# Patient Record
Sex: FEMALE | Race: WHITE | NOT HISPANIC OR LATINO | Employment: FULL TIME | URBAN - METROPOLITAN AREA
[De-identification: names, ages, dates, MRNs, and addresses within clinical notes are randomized per-mention and may not be internally consistent; named-entity substitution may affect disease eponyms.]

---

## 2022-08-30 ENCOUNTER — TELEPHONE (OUTPATIENT)
Dept: ORTHOPEDIC SURGERY | Facility: CLINIC | Age: 67
End: 2022-08-30

## 2022-08-30 NOTE — TELEPHONE ENCOUNTER
THE ISSUE IS MORE IN HER LEG THAN HER HIP - SHE HAS A GURPREET FROM HER HIP TO HER KNEE AND A LUMP THAT IS GIVING HER PROBLEMS - THE SURGERY WAS DONE ON 12/23/1990 AT [facility] - SHE BELIEVES THE SURGEON WAS DR FRITZ. ALENA HAS HER NOTES

## 2022-09-12 ENCOUNTER — OFFICE VISIT (OUTPATIENT)
Dept: ORTHOPEDIC SURGERY | Facility: CLINIC | Age: 67
End: 2022-09-12

## 2022-09-12 VITALS — HEIGHT: 66 IN | OXYGEN SATURATION: 96 % | BODY MASS INDEX: 24.04 KG/M2 | HEART RATE: 95 BPM | WEIGHT: 149.6 LBS

## 2022-09-12 DIAGNOSIS — M70.62 TROCHANTERIC BURSITIS OF LEFT HIP: ICD-10-CM

## 2022-09-12 DIAGNOSIS — M25.552 LEFT HIP PAIN: Primary | ICD-10-CM

## 2022-09-12 PROCEDURE — 99203 OFFICE O/P NEW LOW 30 MIN: CPT | Performed by: ORTHOPAEDIC SURGERY

## 2022-09-12 RX ORDER — FEXOFENADINE HCL 180 MG/1
TABLET ORAL
COMMUNITY
Start: 2022-06-29

## 2022-09-12 RX ORDER — FLUTICASONE PROPIONATE AND SALMETEROL 50; 100 UG/1; UG/1
POWDER RESPIRATORY (INHALATION)
COMMUNITY
Start: 2022-06-10

## 2022-09-12 RX ORDER — LISINOPRIL 10 MG/1
1 TABLET ORAL DAILY
COMMUNITY

## 2022-09-12 NOTE — PROGRESS NOTES
"Chief Complaint  Initial Evaluation of the Left Hip     Subjective      Kavya De La Cruz presents to [facility] for an evaluation of left hip pain. Patient has a history of a intramedullary hip done in 1990. This has done well until recently. She started having a lot of tenderness and pain along the lateral hip and down the thigh. She denies any groin pain or injury. This began after receiving the COVID vaccine.  She notices stiffness in the gait after sitting for prolonged sitting.     No Known Allergies     Social History     Socioeconomic History   • Marital status:    Tobacco Use   • Smoking status: Never Smoker   • Smokeless tobacco: Never Used        Review of Systems     Objective   Vital Signs:   Pulse 95   Ht 167.6 cm (66\")   Wt 67.9 kg (149 lb 9.6 oz)   SpO2 96%   BMI 24.15 kg/m²       Physical Exam  Constitutional:       Appearance: Normal appearance. Patient is well-developed and normal weight.   HENT:      Head: Normocephalic.      Right Ear: Hearing and external ear normal.      Left Ear: Hearing and external ear normal.      Nose: Nose normal.   Eyes:      Conjunctiva/sclera: Conjunctivae normal.   Cardiovascular:      Rate and Rhythm: Normal rate.   Pulmonary:      Effort: Pulmonary effort is normal.      Breath sounds: No wheezing or rales.   Abdominal:      Palpations: Abdomen is soft.      Tenderness: There is no abdominal tenderness.   Musculoskeletal:      Cervical back: Normal range of motion.   Skin:     Findings: No rash.   Neurological:      Mental Status: Patient  is alert and oriented to person, place, and time.   Psychiatric:         Mood and Affect: Mood and affect normal.         Judgment: Judgment normal.       Ortho Exam      LEFT HIP: Tender greater trochanter. No groin pain with hip motion. Good tone of hip flexors, hip extensors, hip adductor, hip abductors. Full passive hip motion. Calf soft. Dorsal Pedal Pulse 2+, posterior tibialis pulse 2+. " Slight antalgic gait. Hip flexion to 125 degrees. Good strength to hamstrings, quadriceps, dorsiflexors and plantar flexors.       Procedures        Imaging Results (Most Recent)     Procedure Component Value Units Date/Time    XR Hip With or Without Pelvis 2 - 3 View Left [[id number]] Resulted: 09/12/22 0827     Updated: 09/12/22 0830           Result Review :     X-Ray Report:  Left hip(s) X-Ray  Indication: Evaluation of left hip pain   AP and Lateral view(s)  Findings: No acute fractures or dislocation. Mild degenerative changes at the hip. Intramedullary guillaume of the hip and femur in place, no loosening signs.   Prior studies available for comparison: no     Assessment and Plan     Diagnoses and all orders for this visit:    1. Left hip pain (Primary)  -     XR Hip With or Without Pelvis 2 - 3 View Left    2. Trochanteric bursitis of left hip        Patient offered a hip bursa injection today, she will hold off on this. Physical therapy and at home exercises discussed. She opted for physical therapy.     Call or return if worsening symptoms.    Follow Up     PRN.       Patient was given instructions and counseling regarding her condition or for health maintenance advice. Please see specific information pulled into the AVS if appropriate.     Scribed for True Hatfield MD by Jennifer Chao.  09/12/22   08:08 EDT    I have personally performed the services described in this document as scribed by the above individual and it is both accurate and complete. True Hatfield MD 09/12/22

## 2025-01-10 ENCOUNTER — HOSPITAL ENCOUNTER (EMERGENCY)
Facility: HOSPITAL | Age: 70
Discharge: HOME | End: 2025-01-10
Attending: EMERGENCY MEDICINE
Payer: MEDICARE

## 2025-01-10 ENCOUNTER — APPOINTMENT (OUTPATIENT)
Dept: GENERAL RADIOLOGY | Facility: HOSPITAL | Age: 70
End: 2025-01-10
Payer: MEDICARE

## 2025-01-10 VITALS
DIASTOLIC BLOOD PRESSURE: 70 MMHG | SYSTOLIC BLOOD PRESSURE: 141 MMHG | HEIGHT: 66 IN | WEIGHT: 146.61 LBS | OXYGEN SATURATION: 96 % | HEART RATE: 75 BPM | RESPIRATION RATE: 20 BRPM | BODY MASS INDEX: 23.56 KG/M2 | TEMPERATURE: 97.7 F

## 2025-01-10 DIAGNOSIS — S52.611A CLOSED DISPLACED FRACTURE OF STYLOID PROCESS OF RIGHT ULNA, INITIAL ENCOUNTER: ICD-10-CM

## 2025-01-10 DIAGNOSIS — S52.501A CLOSED FRACTURE OF DISTAL END OF RIGHT RADIUS, UNSPECIFIED FRACTURE MORPHOLOGY, INITIAL ENCOUNTER: Primary | ICD-10-CM

## 2025-01-10 LAB
ALBUMIN SERPL-MCNC: 3.9 G/DL (ref 3.5–5.2)
ALBUMIN/GLOB SERPL: 1.6 G/DL
ALP SERPL-CCNC: 76 U/L (ref 39–117)
ALT SERPL W P-5'-P-CCNC: 17 U/L (ref 1–33)
ANION GAP SERPL CALCULATED.3IONS-SCNC: 9.9 MMOL/L (ref 5–15)
AST SERPL-CCNC: 22 U/L (ref 1–32)
BACTERIA UR QL AUTO: NORMAL /HPF
BASOPHILS # BLD AUTO: 0.06 10*3/MM3 (ref 0–0.2)
BASOPHILS NFR BLD AUTO: 1 % (ref 0–1.5)
BILIRUB SERPL-MCNC: 0.9 MG/DL (ref 0–1.2)
BILIRUB UR QL STRIP: NEGATIVE
BUN SERPL-MCNC: 9 MG/DL (ref 8–23)
BUN/CREAT SERPL: 12.2 (ref 7–25)
CALCIUM SPEC-SCNC: 8.9 MG/DL (ref 8.6–10.5)
CHLORIDE SERPL-SCNC: 104 MMOL/L (ref 98–107)
CLARITY UR: CLEAR
CO2 SERPL-SCNC: 25.1 MMOL/L (ref 22–29)
COLOR UR: YELLOW
CREAT SERPL-MCNC: 0.74 MG/DL (ref 0.57–1)
DEPRECATED RDW RBC AUTO: 42.8 FL (ref 37–54)
EGFRCR SERPLBLD CKD-EPI 2021: 87.2 ML/MIN/1.73
EOSINOPHIL # BLD AUTO: 0.16 10*3/MM3 (ref 0–0.4)
EOSINOPHIL NFR BLD AUTO: 2.7 % (ref 0.3–6.2)
ERYTHROCYTE [DISTWIDTH] IN BLOOD BY AUTOMATED COUNT: 12.9 % (ref 12.3–15.4)
GEN 5 1HR TROPONIN T REFLEX: <6 NG/L
GLOBULIN UR ELPH-MCNC: 2.4 GM/DL
GLUCOSE SERPL-MCNC: 109 MG/DL (ref 65–99)
GLUCOSE UR STRIP-MCNC: NEGATIVE MG/DL
HCT VFR BLD AUTO: 41.2 % (ref 34–46.6)
HGB BLD-MCNC: 13.6 G/DL (ref 12–15.9)
HGB UR QL STRIP.AUTO: NEGATIVE
HOLD SPECIMEN: NORMAL
HOLD SPECIMEN: NORMAL
HYALINE CASTS UR QL AUTO: NORMAL /LPF
IMM GRANULOCYTES # BLD AUTO: 0.03 10*3/MM3 (ref 0–0.05)
IMM GRANULOCYTES NFR BLD AUTO: 0.5 % (ref 0–0.5)
KETONES UR QL STRIP: NEGATIVE
LEUKOCYTE ESTERASE UR QL STRIP.AUTO: ABNORMAL
LYMPHOCYTES # BLD AUTO: 1.7 10*3/MM3 (ref 0.7–3.1)
LYMPHOCYTES NFR BLD AUTO: 28.5 % (ref 19.6–45.3)
MAGNESIUM SERPL-MCNC: 2.2 MG/DL (ref 1.6–2.4)
MCH RBC QN AUTO: 30.2 PG (ref 26.6–33)
MCHC RBC AUTO-ENTMCNC: 33 G/DL (ref 31.5–35.7)
MCV RBC AUTO: 91.4 FL (ref 79–97)
MONOCYTES # BLD AUTO: 0.65 10*3/MM3 (ref 0.1–0.9)
MONOCYTES NFR BLD AUTO: 10.9 % (ref 5–12)
NEUTROPHILS NFR BLD AUTO: 3.37 10*3/MM3 (ref 1.7–7)
NEUTROPHILS NFR BLD AUTO: 56.4 % (ref 42.7–76)
NITRITE UR QL STRIP: NEGATIVE
NRBC BLD AUTO-RTO: 0 /100 WBC (ref 0–0.2)
PH UR STRIP.AUTO: 7.5 [PH] (ref 5–8)
PLATELET # BLD AUTO: 252 10*3/MM3 (ref 140–450)
PMV BLD AUTO: 10.2 FL (ref 6–12)
POTASSIUM SERPL-SCNC: 4.1 MMOL/L (ref 3.5–5.2)
PROT SERPL-MCNC: 6.3 G/DL (ref 6–8.5)
PROT UR QL STRIP: NEGATIVE
QT INTERVAL: 376 MS
QTC INTERVAL: 408 MS
RBC # BLD AUTO: 4.51 10*6/MM3 (ref 3.77–5.28)
RBC # UR STRIP: NORMAL /HPF
REF LAB TEST METHOD: NORMAL
SODIUM SERPL-SCNC: 139 MMOL/L (ref 136–145)
SP GR UR STRIP: 1.01 (ref 1–1.03)
SQUAMOUS #/AREA URNS HPF: NORMAL /HPF
TROPONIN T NUMERIC DELTA: NORMAL
TROPONIN T SERPL HS-MCNC: <6 NG/L
UROBILINOGEN UR QL STRIP: ABNORMAL
WBC # UR STRIP: NORMAL /HPF
WBC NRBC COR # BLD AUTO: 5.97 10*3/MM3 (ref 3.4–10.8)
WHOLE BLOOD HOLD COAG: NORMAL
WHOLE BLOOD HOLD SPECIMEN: NORMAL

## 2025-01-10 PROCEDURE — 84484 ASSAY OF TROPONIN QUANT: CPT | Performed by: EMERGENCY MEDICINE

## 2025-01-10 PROCEDURE — 81001 URINALYSIS AUTO W/SCOPE: CPT | Performed by: EMERGENCY MEDICINE

## 2025-01-10 PROCEDURE — 83735 ASSAY OF MAGNESIUM: CPT | Performed by: EMERGENCY MEDICINE

## 2025-01-10 PROCEDURE — 73090 X-RAY EXAM OF FOREARM: CPT

## 2025-01-10 PROCEDURE — 71045 X-RAY EXAM CHEST 1 VIEW: CPT

## 2025-01-10 PROCEDURE — 80053 COMPREHEN METABOLIC PANEL: CPT | Performed by: EMERGENCY MEDICINE

## 2025-01-10 PROCEDURE — 93005 ELECTROCARDIOGRAM TRACING: CPT | Performed by: EMERGENCY MEDICINE

## 2025-01-10 PROCEDURE — 85025 COMPLETE CBC W/AUTO DIFF WBC: CPT | Performed by: EMERGENCY MEDICINE

## 2025-01-10 PROCEDURE — 99284 EMERGENCY DEPT VISIT MOD MDM: CPT

## 2025-01-10 PROCEDURE — 36415 COLL VENOUS BLD VENIPUNCTURE: CPT

## 2025-01-10 PROCEDURE — 25010000002 ONDANSETRON PER 1 MG

## 2025-01-10 PROCEDURE — 96374 THER/PROPH/DIAG INJ IV PUSH: CPT

## 2025-01-10 RX ORDER — HYDROCODONE BITARTRATE AND ACETAMINOPHEN 5; 325 MG/1; MG/1
1 TABLET ORAL EVERY 6 HOURS PRN
Status: DISCONTINUED | OUTPATIENT
Start: 2025-01-10 | End: 2025-01-10

## 2025-01-10 RX ORDER — ONDANSETRON 2 MG/ML
4 INJECTION INTRAMUSCULAR; INTRAVENOUS ONCE
Status: COMPLETED | OUTPATIENT
Start: 2025-01-10 | End: 2025-01-10

## 2025-01-10 RX ORDER — ONDANSETRON 4 MG/1
4 TABLET, ORALLY DISINTEGRATING ORAL ONCE
Status: DISCONTINUED | OUTPATIENT
Start: 2025-01-10 | End: 2025-01-10

## 2025-01-10 RX ORDER — HYDROCODONE BITARTRATE AND ACETAMINOPHEN 5; 325 MG/1; MG/1
1 TABLET ORAL ONCE
Status: COMPLETED | OUTPATIENT
Start: 2025-01-10 | End: 2025-01-10

## 2025-01-10 RX ORDER — HYDROCODONE BITARTRATE AND ACETAMINOPHEN 5; 325 MG/1; MG/1
1 TABLET ORAL 4 TIMES DAILY PRN
Qty: 12 TABLET | Refills: 0 | Status: SHIPPED | OUTPATIENT
Start: 2025-01-10 | End: 2025-01-15 | Stop reason: HOSPADM

## 2025-01-10 RX ORDER — SODIUM CHLORIDE 0.9 % (FLUSH) 0.9 %
10 SYRINGE (ML) INJECTION AS NEEDED
Status: DISCONTINUED | OUTPATIENT
Start: 2025-01-10 | End: 2025-01-10 | Stop reason: HOSPADM

## 2025-01-10 RX ADMIN — ONDANSETRON 4 MG: 2 INJECTION INTRAMUSCULAR; INTRAVENOUS at 10:00

## 2025-01-10 RX ADMIN — HYDROCODONE BITARTRATE AND ACETAMINOPHEN 1 TABLET: 5; 325 TABLET ORAL at 09:59

## 2025-01-10 NOTE — ED PROVIDER NOTES
Time: 9:44 AM EST  Date of encounter:  1/10/2025  Independent Historian/Clinical History and Information was obtained by:   Patient    History is limited by: N/A    Chief Complaint   Patient presents with    Dizziness    Fall         History of Present Illness:  Patient is a 69 y.o. year old female who presents to the emergency department for evaluation of right wrist pain.  Patient states she was walking out to check the mail and when she stepped out she hit ice causing her to fall she is unsure what she hit her wrist on but when she tried to push herself up she had pain in the right wrist and was unable to bear weight on it.  Denies head injury or LOC.  Patient states she got back in her vehicle and drove back up the driveway to her office and when she stepped out she felt sick and dizzy from the pain.  Denies chest pain or shortness of breath.  Denies head injury.    Patient Care Team  Primary Care Provider: ProviderSonia Known    Past Medical History:     No Known Allergies  History reviewed. No pertinent past medical history.  History reviewed. No pertinent surgical history.  History reviewed. No pertinent family history.    Home Medications:  Prior to Admission medications    Medication Sig Start Date End Date Taking? Authorizing Provider   Advair Diskus 100-50 MCG/ACT DISKUS  6/10/22   Nancy Hurt MD   fexofenadine (ALLEGRA) 180 MG tablet  6/29/22   Nancy Hurt MD   lisinopril (PRINIVIL,ZESTRIL) 10 MG tablet Take 1 tablet by mouth Daily.    ProviderNancy MD        Social History:   Social History     Tobacco Use    Smoking status: Never    Smokeless tobacco: Never         Review of Systems:  Review of Systems   Constitutional: Negative.    HENT: Negative.     Eyes: Negative.    Respiratory: Negative.     Cardiovascular: Negative.    Gastrointestinal: Negative.    Endocrine: Negative.    Genitourinary: Negative.    Musculoskeletal:  Positive for arthralgias and joint swelling.  "  Skin: Negative.    Allergic/Immunologic: Negative.    Neurological: Negative.    Hematological: Negative.    Psychiatric/Behavioral: Negative.          Physical Exam:  /70   Pulse 75   Temp 97.7 °F (36.5 °C) (Oral)   Resp 20   Ht 167.6 cm (66\")   Wt 66.5 kg (146 lb 9.7 oz)   SpO2 96%   BMI 23.66 kg/m²         Physical Exam  Vitals and nursing note reviewed.   Constitutional:       General: She is not in acute distress.     Appearance: Normal appearance. She is not toxic-appearing.   HENT:      Head: Normocephalic and atraumatic.      Jaw: There is normal jaw occlusion.      Mouth/Throat:      Mouth: Mucous membranes are moist.      Pharynx: Oropharynx is clear.   Eyes:      General: Lids are normal.      Extraocular Movements: Extraocular movements intact.      Conjunctiva/sclera: Conjunctivae normal.      Pupils: Pupils are equal, round, and reactive to light.   Cardiovascular:      Rate and Rhythm: Normal rate and regular rhythm.      Pulses: Normal pulses.      Heart sounds: Normal heart sounds.   Pulmonary:      Effort: Pulmonary effort is normal. No respiratory distress.      Breath sounds: Normal breath sounds. No stridor. No wheezing, rhonchi or rales.   Abdominal:      General: Abdomen is flat. Bowel sounds are normal.      Palpations: Abdomen is soft.      Tenderness: There is no abdominal tenderness. There is no right CVA tenderness, left CVA tenderness, guarding or rebound.   Musculoskeletal:         General: Normal range of motion.      Right wrist: Swelling, deformity and tenderness present. Decreased range of motion. Normal pulse.      Cervical back: Normal range of motion and neck supple.      Right lower leg: No edema.      Left lower leg: No edema.      Comments: Right wrist swelling with CMS intact distally.   Skin:     General: Skin is warm and dry.   Neurological:      Mental Status: She is alert and oriented to person, place, and time. Mental status is at baseline.   Psychiatric:  "        Mood and Affect: Mood normal.                       Medical Decision Making:      Comorbidities that affect care:    None    External Notes reviewed:    None      The following orders were placed and all results were independently analyzed by me:  Orders Placed This Encounter   Procedures    Splint Application    XR Chest 1 View    XR Forearm 2 View Right    [place] Draw    Comprehensive Metabolic Panel    High Sensitivity Troponin T    Magnesium    Urinalysis With Microscopic If Indicated (No Culture) - Urine, Clean Catch    CBC Auto Differential    High Sensitivity Troponin T 1Hr    Urinalysis, Microscopic Only - Urine, Clean Catch    Ambulatory Referral to Orthopedic Surgery    NPO Diet NPO Type: Strict NPO    Undress & Gown    Continuous Pulse Oximetry    Vital Signs    Orthostatic Blood Pressure    Obtain & Apply The Following- Upper extremity; Sling    Orthopedics (on-call MD unless specified)    Oxygen Therapy- Nasal Cannula; Titrate 1-6 LPM Per SpO2; 90 - 95%    POC Glucose Once    ECG 12 Lead ED Triage Standing Order; Weak / Dizzy / AMS    Insert Peripheral IV    Fall Precautions    CBC & Differential    Green Top (Gel)    Lavender Top    Gold Top - SST    Light Blue Top       Medications Given in the Emergency Department:  Medications   sodium chloride 0.9 % flush 10 mL (has no administration in time range)   HYDROcodone-acetaminophen (NORCO) 5-325 MG per tablet 1 tablet (1 tablet Oral Given 1/10/25 0959)   ondansetron (ZOFRAN) injection 4 mg (4 mg Intravenous Given 1/10/25 1000)        ED Course:    The patient was initially evaluated in the triage area where orders were placed. The patient was later dispositioned by VASU Crane.      The patient was advised to stay for completion of workup which includes but is not limited to communication of labs and radiological results, reassessment and plan. The patient was advised that leaving prior to disposition by a provider could result in  critical findings that are not communicated to the patient.          Labs:    Lab Results (last 24 hours)       Procedure Component Value Units Date/Time    CBC & Differential [[id number]]  (Normal) Collected: 01/10/25 0732    Specimen: Blood Updated: 01/10/25 0741    Narrative:      The following orders were created for panel order CBC & Differential.  Procedure                               Abnormality         Status                     ---------                               -----------         ------                     CBC Auto Differential[[id number]]        Normal              Final result                 Please view results for these tests on the individual orders.    Comprehensive Metabolic Panel [[id number]]  (Abnormal) Collected: 01/10/25 0732    Specimen: Blood Updated: 01/10/25 0800     Glucose 109 mg/dL      BUN 9 mg/dL      Creatinine 0.74 mg/dL      Sodium 139 mmol/L      Potassium 4.1 mmol/L      Comment: Slight hemolysis detected by analyzer. Result may be falsely elevated.        Chloride 104 mmol/L      CO2 25.1 mmol/L      Calcium 8.9 mg/dL      Total Protein 6.3 g/dL      Albumin 3.9 g/dL      ALT (SGPT) 17 U/L      AST (SGOT) 22 U/L      Comment: Slight hemolysis detected by analyzer. Result may be falsely elevated.        Alkaline Phosphatase 76 U/L      Total Bilirubin 0.9 mg/dL      Globulin 2.4 gm/dL      A/G Ratio 1.6 g/dL      BUN/Creatinine Ratio 12.2     Anion Gap 9.9 mmol/L      eGFR 87.2 mL/min/1.73     Narrative:      GFR Categories in Chronic Kidney Disease (CKD)      GFR Category          GFR (mL/min/1.73)    Interpretation  G1                     90 or greater         Normal or high (1)  G2                      60-89                Mild decrease (1)  G3a                   45-59                Mild to moderate decrease  G3b                   30-44                Moderate to severe decrease  G4                    15-29                Severe decrease  G5                    14 or less            Kidney failure          (1)In the absence of evidence of kidney disease, neither GFR category G1 or G2 fulfill the criteria for CKD.    eGFR calculation 2021 CKD-EPI creatinine equation, which does not include race as a factor    High Sensitivity Troponin T [[id number]]  (Normal) Collected: 01/10/25 0732    Specimen: Blood Updated: 01/10/25 0800     HS Troponin T <6 ng/L     Narrative:      High Sensitive Troponin T Reference Range:  <14.0 ng/L- Negative Female for AMI  <22.0 ng/L- Negative Male for AMI  >=14 - Abnormal Female indicating possible myocardial injury.  >=22 - Abnormal Male indicating possible myocardial injury.   Clinicians would have to utilize clinical acumen, EKG, Troponin, and serial changes to determine if it is an Acute Myocardial Infarction or myocardial injury due to an underlying chronic condition.         Magnesium [[id number]]  (Normal) Collected: 01/10/25 0732    Specimen: Blood Updated: 01/10/25 0800     Magnesium 2.2 mg/dL     CBC Auto Differential [[id number]]  (Normal) Collected: 01/10/25 0732    Specimen: Blood Updated: 01/10/25 0741     WBC 5.97 10*3/mm3      RBC 4.51 10*6/mm3      Hemoglobin 13.6 g/dL      Hematocrit 41.2 %      MCV 91.4 fL      MCH 30.2 pg      MCHC 33.0 g/dL      RDW 12.9 %      RDW-SD 42.8 fl      MPV 10.2 fL      Platelets 252 10*3/mm3      Neutrophil % 56.4 %      Lymphocyte % 28.5 %      Monocyte % 10.9 %      Eosinophil % 2.7 %      Basophil % 1.0 %      Immature Grans % 0.5 %      Neutrophils, Absolute 3.37 10*3/mm3      Lymphocytes, Absolute 1.70 10*3/mm3      Monocytes, Absolute 0.65 10*3/mm3      Eosinophils, Absolute 0.16 10*3/mm3      Basophils, Absolute 0.06 10*3/mm3      Immature Grans, Absolute 0.03 10*3/mm3      nRBC 0.0 /100 WBC     Urinalysis With Microscopic If Indicated (No Culture) - Urine, Clean Catch [[id number]]  (Abnormal) Collected: 01/10/25 0849    Specimen: Urine, Clean Catch Updated: 01/10/25 0910     Color, UA Yellow     Appearance,  UA Clear     pH, UA 7.5     Specific Gravity, UA 1.009     Glucose, UA Negative     Ketones, UA Negative     Bilirubin, UA Negative     Blood, UA Negative     Protein, UA Negative     Leuk Esterase, UA Small (1+)     Nitrite, UA Negative     Urobilinogen, UA 1.0 E.U./dL    Urinalysis, Microscopic Only - Urine, Clean Catch [[id number]] Collected: 01/10/25 0849    Specimen: Urine, Clean Catch Updated: 01/10/25 0910     RBC, UA 0-2 /HPF      WBC, UA 0-2 /HPF      Bacteria, UA None Seen /HPF      Squamous Epithelial Cells, UA 0-2 /HPF      Hyaline Casts, UA 3-6 /LPF      Methodology Automated Microscopy    High Sensitivity Troponin T 1Hr [[id number]] Collected: 01/10/25 0854    Specimen: Blood Updated: 01/10/25 0921     HS Troponin T <6 ng/L      Troponin T Numeric Delta --     Comment: Unable to calculate.       Narrative:      High Sensitive Troponin T Reference Range:  <14.0 ng/L- Negative Female for AMI  <22.0 ng/L- Negative Male for AMI  >=14 - Abnormal Female indicating possible myocardial injury.  >=22 - Abnormal Male indicating possible myocardial injury.   Clinicians would have to utilize clinical acumen, EKG, Troponin, and serial changes to determine if it is an Acute Myocardial Infarction or myocardial injury due to an underlying chronic condition.                  Imaging:    XR Chest 1 View    Result Date: 1/10/2025  XR CHEST 1 VW, XR FOREARM 2 VW RIGHT Date of Exam: 1/10/2025 7:40 AM EST Indication: Weak/Dizzy/AMS triage protocol Comparison: None available. Findings: There are no airspace consolidations. No pleural fluid. No pneumothorax. The pulmonary vasculature appears within normal limits. The cardiac and mediastinal silhouette appear unremarkable. No acute osseous abnormality identified. Evaluation of the right forearm demonstrates a comminuted distal radius fracture with mild dorsal displacement. There is generalized soft tissue swelling. A minimally displaced ulnar styloid process fracture is  suspected. The mid shaft and proximal right  radius and ulna appear intact.     Impression: 1. No acute cardiopulmonary process. 2. Comminuted distal right radius fracture and suspected minimally displaced ulnar styloid process fracture with generalized soft tissue swelling at the level of the wrist. Electronically Signed: Phyllis Palma MD  1/10/2025 8:04 AM EST  Workstation ID: [id number]    XR Forearm 2 View Right    Result Date: 1/10/2025  XR CHEST 1 VW, XR FOREARM 2 VW RIGHT Date of Exam: 1/10/2025 7:40 AM EST Indication: Weak/Dizzy/AMS triage protocol Comparison: None available. Findings: There are no airspace consolidations. No pleural fluid. No pneumothorax. The pulmonary vasculature appears within normal limits. The cardiac and mediastinal silhouette appear unremarkable. No acute osseous abnormality identified. Evaluation of the right forearm demonstrates a comminuted distal radius fracture with mild dorsal displacement. There is generalized soft tissue swelling. A minimally displaced ulnar styloid process fracture is suspected. The mid shaft and proximal right  radius and ulna appear intact.     Impression: 1. No acute cardiopulmonary process. 2. Comminuted distal right radius fracture and suspected minimally displaced ulnar styloid process fracture with generalized soft tissue swelling at the level of the wrist. Electronically Signed: Phyllis Palma MD  1/10/2025 8:04 AM EST  Workstation ID: [id number]       Differential Diagnosis and Discussion:      Orthopedic Injuries: Differential diagnosis includes but is not limited to fractures, soft tissue injuries, dislocations, contusions, ligamentous injuries, tendon injuries, nerve injuries, compartment syndrome, bursitis, and vascular injuries.    PROCEDURES:    Labs were collected in the emergency department and all labs were reviewed and interpreted by me.  X-ray were performed in the emergency department and all X-ray impressions were independently  interpreted by me.  An EKG was performed and the EKG was interpreted by supervising attending.    ECG 12 Lead ED Triage Standing Order; Weak / Dizzy / AMS   Preliminary Result   HEART RATE=71  bpm   RR Wymbpnud=159  ms   LA Rhrakldn=451  ms   P Horizontal Axis=-4  deg   P Front Axis=25  deg   QRSD Interval=87  ms   QT Exdpwpgs=404  ms   FUbK=052  ms   QRS Axis=-5  deg   T Wave Axis=45  deg   - NORMAL ECG -   Sinus rhythm   Date and Time of Study:2025-01-10 07:58:33           Procedures    MDM  Number of Diagnoses or Management Options  Closed displaced fracture of styloid process of right ulna, initial encounter  Closed fracture of distal end of right radius, unspecified fracture morphology, initial encounter  Diagnosis management comments: The patient´s symptoms are consistent with musculoskeletal pain. The patient is now resting comfortably, feels better, is alert, talkative, interactive and in no distress. The repeat examination is unremarkable and benign. The patient has circulatory, motor, and sensory intact. The patient is otherwise alert and well appearing. The history, physical exam, and diagnostics (if any) do not suggest the presence of soft tissue sprain, tendonitis, tendon injury, dislocation, deep vein thrombosis, arterial insufficiency, osteoarthritis, bursitis, ligamentous damage or other process requiring further testing, treatment or consultation in the emergency department. The vital signs have been stable. The patient's condition is stable and appropriate for discharge. The patient will pursue further outpatient evaluation with the primary care physician or other designated for consulting position as indicated in the discharge instructions.  Imaging reveals distal radius fracture and possible ulnar styloid fracture.  Patient was placed in a sugar-tong splint and given a referral to Dr. Snell for follow-up in the office.  Patient is also provided a prescription for pain medication.  Patient  verbalized understanding and is agreeable plan for discharge.       Amount and/or Complexity of Data Reviewed  Clinical lab tests: reviewed and ordered  Tests in the radiology section of CPT®: reviewed and ordered  Tests in the medicine section of CPT®: reviewed and ordered  Review and summarize past medical records: yes  Discuss the patient with other providers: yes  Independent visualization of images, tracings, or specimens: yes           Patient Care Considerations:    ANTIBIOTICS: I considered prescribing antibiotics as an outpatient however no bacterial focus of infection was found.      Consultants/Shared Management Plan:    SHARED VISIT: I have discussed the case with my supervising physician, Dr. Carlos who stateshe will sign pt narcotic prescription for pain control at home. The substantive portion of the medical decision was made by the attesting physician who made or approve the management plan and will take responsibility for the patient.  Clinical findings were discussed and ultimate disposition was made in consult with supervising physician.  Consultant: I have discussed the case with Dr. Rosado who states place patient in sugar-tong and have them follow-up in the office with him.    Social Determinants of Health:    Patient is independent, reliable, and has access to care.       Disposition and Care Coordination:    Discharged: The patient is suitable and stable for discharge with no need for consideration of admission.    I have explained the patient´s condition, diagnoses and treatment plan based on the information available to me at this time. I have answered questions and addressed any concerns. The patient has a good  understanding of the patient´s diagnosis, condition, and treatment plan as can be expected at this point. The vital signs have been stable. The patient´s condition is stable and appropriate for discharge from the emergency department.      The patient will pursue further  outpatient evaluation with the primary care physician or other designated or consulting physician as outlined in the discharge instructions. They are agreeable to this plan of care and follow-up instructions have been explained in detail. The patient has received these instructions in written format and has expressed an understanding of the discharge instructions. The patient is aware that any significant change in condition or worsening of symptoms should prompt an immediate return to this or the closest emergency department or call to 1.  I have explained discharge medications and the need for follow up with the patient/caretakers. This was also printed in the discharge instructions. Patient was discharged with the following medications and follow up:      Medication List        New Prescriptions      HYDROcodone-acetaminophen 5-325 MG per tablet  Commonly known as: NORCO  Take 1 tablet by mouth 4 (Four) Times a Day As Needed for Moderate Pain.               Where to Get Your Medications        These medications [id number] [place] [facility] [address] AT [facility] [phone] [facility] [phone] FX  [address] [address]      Phone: [phone]   HYDROcodone-acetaminophen 5-325 MG per tablet      Yousuf Rosado MD  [address]  [place] [id number] [phone]          Provider, No Known  [facility] [address] [phone]    Call   As needed       Final diagnoses:   Closed fracture of distal end of right radius, unspecified fracture morphology, initial encounter   Closed displaced fracture of styloid process of right ulna, initial encounter        ED Disposition       ED Disposition   Discharge    Condition   Stable    Comment   --               This medical record created using voice recognition software.             Nayeli Freeman, APRN  01/10/25 1244

## 2025-01-10 NOTE — DISCHARGE INSTRUCTIONS
Home.  We have sent a prescription for pain medicine to your pharmacy.  Please  and take as directed for pain control.  Keep your splint clean and dry.  We have also provided you a sling for comfort and stability.  Wear this when you are up moving around.  However, when you are sitting make sure you have arm elevated and you are applying ice at least 4 times daily.  Using a barrier between your skin and the ice leave in place for 15 to 20 minutes at least 4 times a day.    A referral has been sent to Dr. Rosado.  Their office will call you and schedule an appointment for follow-up.    If symptoms worsen, change in presentation, or you develop new symptoms please seek medical attention or return to the ED for further evaluation.

## 2025-01-14 ENCOUNTER — PREP FOR SURGERY (OUTPATIENT)
Dept: OTHER | Facility: HOSPITAL | Age: 70
End: 2025-01-14
Payer: MEDICARE

## 2025-01-14 ENCOUNTER — ANESTHESIA EVENT (OUTPATIENT)
Dept: PERIOP | Facility: HOSPITAL | Age: 70
End: 2025-01-14
Payer: MEDICARE

## 2025-01-14 ENCOUNTER — OFFICE VISIT (OUTPATIENT)
Dept: ORTHOPEDIC SURGERY | Facility: CLINIC | Age: 70
End: 2025-01-14
Payer: MEDICARE

## 2025-01-14 VITALS — HEIGHT: 66 IN | WEIGHT: 146 LBS | BODY MASS INDEX: 23.46 KG/M2

## 2025-01-14 DIAGNOSIS — S62.101A WRIST FRACTURE, CLOSED, RIGHT, INITIAL ENCOUNTER: Primary | ICD-10-CM

## 2025-01-14 DIAGNOSIS — S52.501A CLOSED FRACTURE OF DISTAL END OF RIGHT RADIUS, UNSPECIFIED FRACTURE MORPHOLOGY, INITIAL ENCOUNTER: Primary | ICD-10-CM

## 2025-01-14 RX ORDER — RIZATRIPTAN BENZOATE 10 MG/1
10 TABLET ORAL
COMMUNITY

## 2025-01-14 RX ORDER — ALBUTEROL SULFATE 90 UG/1
2 INHALANT RESPIRATORY (INHALATION) EVERY 4 HOURS PRN
COMMUNITY

## 2025-01-14 NOTE — PRE-PROCEDURE INSTRUCTIONS
ATIENT INSTRUCTED TO BE:    - NOTHING TO EAT AFTER MIDNIGHT OR CHEW, EXCEPT CAN HAVE CLEAR LIQUIDS 2 HOURS PRIOR TO SURGERY ARRIVAL TIME , NO MORE THAN 8 OZ. (NOTHING RED)     - TO HOLD ALL VITAMINS, SUPPLEMENTS, NSAIDS FOR ONE WEEK PRIOR TO THEIR SURGICAL PROCEDURE    - DO NOT TAKE __________NA____________ 7 DAYS PRIOR TO PROCEDURE PER ANESTHESIA RECOMMENDATIONS/INSTRUCTIONS     - INSTRUCTED PT TO USE SURGICAL SOAP 1 TIME THE NIGHT PRIOR TO SURGERY ___________ OR THE AM OF SURGERY _____________   USE THE SOAP FROM NECK TO TOES, AVOID THEIR FACE, HAIR, AND PRIVATE PARTS. IF USE THE SOAP THE NIGHT PRIOR TO SURGERY, CHANGE BED LINENS AND NO PETS IN THE BED.     INSTRUCTED NO LOTIONS, JEWELRY, PIERCINGS,  NAIL POLISH, OR DEODORANT DAY OF SURGERY    - IF DIABETIC, CHECK BLOOD GLUCOSE IF LESS THAN 70 OR HAVING SYMPTOMS CALL THE PREOP AREA FOR INSTRUCTIONS ON AM OF SURGERY ([phone]  -INSTRUCTED TO TAKE THE FOLLOWING MEDICATIONS THE DAY OF SURGERY WITH SIPS OF WATER:   ASHLEY INHALER, PROAIR INHALER AS NEEDED, NORCO AS NEEDED, TYLENOL AS NEEDED        - DO NOT BRING ANY MEDICATIONS WITH YOU TO THE HOSPITAL THE DAY OF SURGERY, EXCEPT IF USE INHALERS. BRING INHALERS DAY OF SURGERY       - BRING CPAP OR BIPAP TO THE HOSPITAL ONLY IF YOU ARE SPENDING THE NIGHT    - DO NOT SMOKE OR VAPE 24 HOURS PRIOR TO PROCEDURE PER ANESTHESIA REQUEST     -MAKE SURE YOU HAVE A RIDE HOME OR SOMEONE TO STAY WITH YOU THE DAY OF THE PROCEDURE AFTER YOU GO HOME     - FOLLOW ANY OTHER INSTRUCTIONS GIVEN TO YOU BY YOUR SURGEON'S OFFICE.     - DAY OF SURGERY ____________, COME TO ELEVATOR A, THIRD FLOOR, CHECK IN AT THE DESK FOR REGISTRATION/SURGERY   - YOU WILL RECEIVE A PHONE CALL THE DAY PRIOR TO SURGERY BETWEEN 1PM AND 4 PM WITH ARRIVAL TIME, IF YOUR SURGERY IS ON A MONDAY YOU WILL RECEIVE A CALL THE FRIDAY PRIOR TO SURGERY DATE    - BRING CASH OR CREDIT CARD FOR COPAYMENT OF MEDICATIONS AFTER SURGERY IF YOU USE THE HOSPITAL PHARMACY (MEDS TO  BED)    - PREADMISSION TESTING NURSE KIKA FRIED [phone] IF HAVE ANY QUESTIONS     -PATIENT PROVIDED THE NUMBER FOR PREOP SURGICAL DEPT IF HAD QUESTIONS AFTER HOURS PRIOR TO SURGERY ([phone] ).  INFORMED PT IF NO ANSWER, LEAVE A MESSAGE AND SOMEONE WILL RETURN THEIR CALL       PATIENT VERBALIZED UNDERSTANDING

## 2025-01-14 NOTE — PROGRESS NOTES
"Chief Complaint  Initial Evaluation of the Right Wrist       Subjective      Kavya De La Cruz presents to [facility] for an evaluation  of her right wrist. She got of her car when she slipped and fell and landed on her right wrist. She went to the emergency room on 01/10/25 where she had x-rays and placed into a long arm splint and sling. She denies any prior surgery on her right wrist.     No Known Allergies     Social History     Socioeconomic History    Marital status:    Tobacco Use    Smoking status: Never    Smokeless tobacco: Never   Substance and Sexual Activity    Alcohol use: Defer    Drug use: Defer    Sexual activity: Defer        I reviewed the patient's chief complaint, history of present illness, review of systems, past medical history, surgical history, family history, social history, medications, and allergy list.     Review of Systems     Constitutional: Denies fevers, chills, weight loss  Cardiovascular: Denies chest pain, shortness of breath  Skin: Denies rashes, acute skin changes  Neurologic: Denies headache, loss of consciousness  MSK: Right wrist pain       Vital Signs:   Ht 167.6 cm (66\")   Wt 66.2 kg (146 lb)   BMI 23.57 kg/m²            Ortho Exam    Physical Exam  General:Alert. No acute distress     Right upper extremity: long arm splint is clean dry and intact, able to wiggle her fingers, neurovascularly intact, sensation intact, positive  pulses, limited range of motion to the wrist secondary to pain, tender to palpation to the wrist.     Procedures    Imaging Results (Most Recent)       None             Result Review :       XR Chest 1 View    Result Date: 1/10/2025  Narrative: XR CHEST 1 VW, XR FOREARM 2 VW RIGHT Date of Exam: 1/10/2025 7:40 AM EST Indication: Weak/Dizzy/AMS triage protocol Comparison: None available. Findings: There are no airspace consolidations. No pleural fluid. No pneumothorax. The pulmonary vasculature appears within normal " limits. The cardiac and mediastinal silhouette appear unremarkable. No acute osseous abnormality identified. Evaluation of the right forearm demonstrates a comminuted distal radius fracture with mild dorsal displacement. There is generalized soft tissue swelling. A minimally displaced ulnar styloid process fracture is suspected. The mid shaft and proximal right  radius and ulna appear intact.     Impression: Impression: 1. No acute cardiopulmonary process. 2. Comminuted distal right radius fracture and suspected minimally displaced ulnar styloid process fracture with generalized soft tissue swelling at the level of the wrist. Electronically Signed: Phyllis Palma MD  1/10/2025 8:04 AM EST  Workstation ID: [id number]    XR Forearm 2 View Right    Result Date: 1/10/2025  Narrative: XR CHEST 1 VW, XR FOREARM 2 VW RIGHT Date of Exam: 1/10/2025 7:40 AM EST Indication: Weak/Dizzy/AMS triage protocol Comparison: None available. Findings: There are no airspace consolidations. No pleural fluid. No pneumothorax. The pulmonary vasculature appears within normal limits. The cardiac and mediastinal silhouette appear unremarkable. No acute osseous abnormality identified. Evaluation of the right forearm demonstrates a comminuted distal radius fracture with mild dorsal displacement. There is generalized soft tissue swelling. A minimally displaced ulnar styloid process fracture is suspected. The mid shaft and proximal right  radius and ulna appear intact.     Impression: Impression: 1. No acute cardiopulmonary process. 2. Comminuted distal right radius fracture and suspected minimally displaced ulnar styloid process fracture with generalized soft tissue swelling at the level of the wrist. Electronically Signed: Phyllis Palma MD  1/10/2025 8:04 AM EST  Workstation ID: [id number]            Assessment and Plan     Diagnoses and all orders for this visit:    1. Wrist fracture, closed, right, initial encounter (Primary)        The  patient presents here today for an evaluation  of her right wrist.     Discussed operative treatment options regarding a right wrist ORIF versus non operative treatment options regarding casting to 4 - 6 weeks.     Patient wishes to proceed with operative treatment options. Risks and benefits were discussed and reviewed with the patient today in the office. Patient expressed understanding and wishes to proceed.     Discussed surgery., Risks/benefits discussed with patient including, but not limited to: infection, bleeding, neurovascular damage, re-rupture, aesthetic deformity, need for further surgery, and death., Surgery pamphlet given., and Call or return if worsening symptoms.    Follow Up     2 weeks post-operatively      Will obtain X-Rays of right wrist at next visit.       Patient was given instructions and counseling regarding her condition or for health maintenance advice. Please see specific information pulled into the AVS if appropriate.     Scribed for Yousuf Rosado MD by Kelly Fierro.  01/14/25   10:34 EST    I have personally performed the services described in this document as scribed by the above individual and it is both accurate and complete. Yousuf Rosado MD 01/14/25

## 2025-01-14 NOTE — H&P (VIEW-ONLY)
"Chief Complaint  Initial Evaluation of the Right Wrist       Subjective      Kavya De La Cruz presents to [facility] for an evaluation  of her right wrist. She got of her car when she slipped and fell and landed on her right wrist. She went to the emergency room on 01/10/25 where she had x-rays and placed into a long arm splint and sling. She denies any prior surgery on her right wrist.     No Known Allergies     Social History     Socioeconomic History    Marital status:    Tobacco Use    Smoking status: Never    Smokeless tobacco: Never   Substance and Sexual Activity    Alcohol use: Defer    Drug use: Defer    Sexual activity: Defer        I reviewed the patient's chief complaint, history of present illness, review of systems, past medical history, surgical history, family history, social history, medications, and allergy list.     Review of Systems     Constitutional: Denies fevers, chills, weight loss  Cardiovascular: Denies chest pain, shortness of breath  Skin: Denies rashes, acute skin changes  Neurologic: Denies headache, loss of consciousness  MSK: Right wrist pain       Vital Signs:   Ht 167.6 cm (66\")   Wt 66.2 kg (146 lb)   BMI 23.57 kg/m²            Ortho Exam    Physical Exam  General:Alert. No acute distress     Right upper extremity: long arm splint is clean dry and intact, able to wiggle her fingers, neurovascularly intact, sensation intact, positive  pulses, limited range of motion to the wrist secondary to pain, tender to palpation to the wrist.     Procedures    Imaging Results (Most Recent)       None             Result Review :       XR Chest 1 View    Result Date: 1/10/2025  Narrative: XR CHEST 1 VW, XR FOREARM 2 VW RIGHT Date of Exam: 1/10/2025 7:40 AM EST Indication: Weak/Dizzy/AMS triage protocol Comparison: None available. Findings: There are no airspace consolidations. No pleural fluid. No pneumothorax. The pulmonary vasculature appears within normal " limits. The cardiac and mediastinal silhouette appear unremarkable. No acute osseous abnormality identified. Evaluation of the right forearm demonstrates a comminuted distal radius fracture with mild dorsal displacement. There is generalized soft tissue swelling. A minimally displaced ulnar styloid process fracture is suspected. The mid shaft and proximal right  radius and ulna appear intact.     Impression: Impression: 1. No acute cardiopulmonary process. 2. Comminuted distal right radius fracture and suspected minimally displaced ulnar styloid process fracture with generalized soft tissue swelling at the level of the wrist. Electronically Signed: Phyllis Palma MD  1/10/2025 8:04 AM EST  Workstation ID: [id number]    XR Forearm 2 View Right    Result Date: 1/10/2025  Narrative: XR CHEST 1 VW, XR FOREARM 2 VW RIGHT Date of Exam: 1/10/2025 7:40 AM EST Indication: Weak/Dizzy/AMS triage protocol Comparison: None available. Findings: There are no airspace consolidations. No pleural fluid. No pneumothorax. The pulmonary vasculature appears within normal limits. The cardiac and mediastinal silhouette appear unremarkable. No acute osseous abnormality identified. Evaluation of the right forearm demonstrates a comminuted distal radius fracture with mild dorsal displacement. There is generalized soft tissue swelling. A minimally displaced ulnar styloid process fracture is suspected. The mid shaft and proximal right  radius and ulna appear intact.     Impression: Impression: 1. No acute cardiopulmonary process. 2. Comminuted distal right radius fracture and suspected minimally displaced ulnar styloid process fracture with generalized soft tissue swelling at the level of the wrist. Electronically Signed: Phyllis Palma MD  1/10/2025 8:04 AM EST  Workstation ID: [id number]            Assessment and Plan     Diagnoses and all orders for this visit:    1. Wrist fracture, closed, right, initial encounter (Primary)        The  patient presents here today for an evaluation  of her right wrist.     Discussed operative treatment options regarding a right wrist ORIF versus non operative treatment options regarding casting to 4 - 6 weeks.     Patient wishes to proceed with operative treatment options. Risks and benefits were discussed and reviewed with the patient today in the office. Patient expressed understanding and wishes to proceed.     Discussed surgery., Risks/benefits discussed with patient including, but not limited to: infection, bleeding, neurovascular damage, re-rupture, aesthetic deformity, need for further surgery, and death., Surgery pamphlet given., and Call or return if worsening symptoms.    Follow Up     2 weeks post-operatively      Will obtain X-Rays of right wrist at next visit.       Patient was given instructions and counseling regarding her condition or for health maintenance advice. Please see specific information pulled into the AVS if appropriate.     Scribed for Yousuf Rosado MD by Kelly Fierro.  01/14/25   10:34 EST    I have personally performed the services described in this document as scribed by the above individual and it is both accurate and complete. Yousuf Rosado MD 01/14/25

## 2025-01-15 ENCOUNTER — ANESTHESIA EVENT CONVERTED (OUTPATIENT)
Dept: ANESTHESIOLOGY | Facility: HOSPITAL | Age: 70
End: 2025-01-15
Payer: MEDICARE

## 2025-01-15 ENCOUNTER — HOSPITAL ENCOUNTER (OUTPATIENT)
Facility: HOSPITAL | Age: 70
Setting detail: HOSPITAL OUTPATIENT SURGERY
Discharge: HOME | End: 2025-01-15
Attending: ORTHOPAEDIC SURGERY | Admitting: ORTHOPAEDIC SURGERY
Payer: MEDICARE

## 2025-01-15 ENCOUNTER — ANESTHESIA (OUTPATIENT)
Dept: PERIOP | Facility: HOSPITAL | Age: 70
End: 2025-01-15
Payer: MEDICARE

## 2025-01-15 ENCOUNTER — APPOINTMENT (OUTPATIENT)
Dept: GENERAL RADIOLOGY | Facility: HOSPITAL | Age: 70
End: 2025-01-15
Payer: MEDICARE

## 2025-01-15 VITALS
BODY MASS INDEX: 24.61 KG/M2 | SYSTOLIC BLOOD PRESSURE: 140 MMHG | HEIGHT: 65 IN | RESPIRATION RATE: 16 BRPM | OXYGEN SATURATION: 95 % | TEMPERATURE: 97.8 F | DIASTOLIC BLOOD PRESSURE: 63 MMHG | WEIGHT: 147.71 LBS | HEART RATE: 71 BPM

## 2025-01-15 DIAGNOSIS — S52.611A CLOSED DISPLACED FRACTURE OF STYLOID PROCESS OF RIGHT ULNA, INITIAL ENCOUNTER: ICD-10-CM

## 2025-01-15 DIAGNOSIS — S52.501A CLOSED FRACTURE OF DISTAL END OF RIGHT RADIUS, UNSPECIFIED FRACTURE MORPHOLOGY, INITIAL ENCOUNTER: ICD-10-CM

## 2025-01-15 PROCEDURE — 76000 FLUOROSCOPY <1 HR PHYS/QHP: CPT

## 2025-01-15 PROCEDURE — 25607 OPTX DST RD XARTC FX/EPI SEP: CPT | Performed by: ORTHOPAEDIC SURGERY

## 2025-01-15 PROCEDURE — 25607 OPTX DST RD XARTC FX/EPI SEP: CPT | Performed by: PHYSICIAN ASSISTANT

## 2025-01-15 PROCEDURE — 25010000002 DEXAMETHASONE PER 1 MG

## 2025-01-15 PROCEDURE — 25010000002 KETOROLAC TROMETHAMINE PER 15 MG: Performed by: NURSE ANESTHETIST, CERTIFIED REGISTERED

## 2025-01-15 PROCEDURE — 25010000002 LIDOCAINE PF 2% 2 % SOLUTION

## 2025-01-15 PROCEDURE — 25810000003 LACTATED RINGERS PER 1000 ML: Performed by: ANESTHESIOLOGY

## 2025-01-15 PROCEDURE — 25010000002 FENTANYL CITRATE (PF) 50 MCG/ML SOLUTION

## 2025-01-15 PROCEDURE — 25010000002 ONDANSETRON PER 1 MG

## 2025-01-15 PROCEDURE — C1713 ANCHOR/SCREW BN/BN,TIS/BN: HCPCS | Performed by: ORTHOPAEDIC SURGERY

## 2025-01-15 PROCEDURE — 25010000002 PROPOFOL 10 MG/ML EMULSION

## 2025-01-15 PROCEDURE — 25010000002 BUPIVACAINE (PF) 0.5 % SOLUTION: Performed by: ANESTHESIOLOGY

## 2025-01-15 PROCEDURE — 25010000002 ONDANSETRON PER 1 MG: Performed by: NURSE ANESTHETIST, CERTIFIED REGISTERED

## 2025-01-15 PROCEDURE — 25010000002 HYDROMORPHONE 1 MG/ML SOLUTION: Performed by: NURSE ANESTHETIST, CERTIFIED REGISTERED

## 2025-01-15 PROCEDURE — 25810000003 LACTATED RINGERS PER 1000 ML

## 2025-01-15 RX ORDER — ACETAMINOPHEN 500 MG
1000 TABLET ORAL ONCE
Status: COMPLETED | OUTPATIENT
Start: 2025-01-15 | End: 2025-01-15

## 2025-01-15 RX ORDER — HYDROCODONE BITARTRATE AND ACETAMINOPHEN 7.5; 325 MG/1; MG/1
1 TABLET ORAL EVERY 4 HOURS PRN
Qty: 30 TABLET | Refills: 0 | Status: SHIPPED | OUTPATIENT
Start: 2025-01-15 | End: 2025-01-15

## 2025-01-15 RX ORDER — PROMETHAZINE HYDROCHLORIDE 12.5 MG/1
25 TABLET ORAL ONCE AS NEEDED
Status: DISCONTINUED | OUTPATIENT
Start: 2025-01-15 | End: 2025-01-15 | Stop reason: HOSPADM

## 2025-01-15 RX ORDER — LIDOCAINE HYDROCHLORIDE 20 MG/ML
INJECTION, SOLUTION EPIDURAL; INFILTRATION; INTRACAUDAL; PERINEURAL AS NEEDED
Status: DISCONTINUED | OUTPATIENT
Start: 2025-01-15 | End: 2025-01-15 | Stop reason: SURG

## 2025-01-15 RX ORDER — SODIUM CHLORIDE, SODIUM LACTATE, POTASSIUM CHLORIDE, CALCIUM CHLORIDE 600; 310; 30; 20 MG/100ML; MG/100ML; MG/100ML; MG/100ML
INJECTION, SOLUTION INTRAVENOUS CONTINUOUS PRN
Status: DISCONTINUED | OUTPATIENT
Start: 2025-01-15 | End: 2025-01-15 | Stop reason: SURG

## 2025-01-15 RX ORDER — HYDROCODONE BITARTRATE AND ACETAMINOPHEN 7.5; 325 MG/1; MG/1
1 TABLET ORAL EVERY 4 HOURS PRN
Qty: 30 TABLET | Refills: 0 | Status: SHIPPED | OUTPATIENT
Start: 2025-01-15

## 2025-01-15 RX ORDER — PROMETHAZINE HYDROCHLORIDE 25 MG/1
25 SUPPOSITORY RECTAL ONCE AS NEEDED
Status: DISCONTINUED | OUTPATIENT
Start: 2025-01-15 | End: 2025-01-15 | Stop reason: HOSPADM

## 2025-01-15 RX ORDER — DEXAMETHASONE SODIUM PHOSPHATE 4 MG/ML
INJECTION, SOLUTION INTRA-ARTICULAR; INTRALESIONAL; INTRAMUSCULAR; INTRAVENOUS; SOFT TISSUE AS NEEDED
Status: DISCONTINUED | OUTPATIENT
Start: 2025-01-15 | End: 2025-01-15 | Stop reason: SURG

## 2025-01-15 RX ORDER — ONDANSETRON 2 MG/ML
INJECTION INTRAMUSCULAR; INTRAVENOUS AS NEEDED
Status: DISCONTINUED | OUTPATIENT
Start: 2025-01-15 | End: 2025-01-15 | Stop reason: SURG

## 2025-01-15 RX ORDER — BUPIVACAINE HYDROCHLORIDE 5 MG/ML
INJECTION, SOLUTION EPIDURAL; INTRACAUDAL
Status: COMPLETED | OUTPATIENT
Start: 2025-01-15 | End: 2025-01-15

## 2025-01-15 RX ORDER — OXYCODONE HYDROCHLORIDE 5 MG/1
5 TABLET ORAL
Status: DISCONTINUED | OUTPATIENT
Start: 2025-01-15 | End: 2025-01-15 | Stop reason: HOSPADM

## 2025-01-15 RX ORDER — PROPOFOL 10 MG/ML
VIAL (ML) INTRAVENOUS AS NEEDED
Status: DISCONTINUED | OUTPATIENT
Start: 2025-01-15 | End: 2025-01-15 | Stop reason: SURG

## 2025-01-15 RX ORDER — ONDANSETRON 2 MG/ML
4 INJECTION INTRAMUSCULAR; INTRAVENOUS ONCE AS NEEDED
Status: DISCONTINUED | OUTPATIENT
Start: 2025-01-15 | End: 2025-01-15 | Stop reason: HOSPADM

## 2025-01-15 RX ORDER — KETOROLAC TROMETHAMINE 30 MG/ML
INJECTION, SOLUTION INTRAMUSCULAR; INTRAVENOUS AS NEEDED
Status: DISCONTINUED | OUTPATIENT
Start: 2025-01-15 | End: 2025-01-15 | Stop reason: SURG

## 2025-01-15 RX ORDER — FENTANYL CITRATE 50 UG/ML
INJECTION, SOLUTION INTRAMUSCULAR; INTRAVENOUS AS NEEDED
Status: DISCONTINUED | OUTPATIENT
Start: 2025-01-15 | End: 2025-01-15 | Stop reason: SURG

## 2025-01-15 RX ORDER — SODIUM CHLORIDE, SODIUM LACTATE, POTASSIUM CHLORIDE, CALCIUM CHLORIDE 600; 310; 30; 20 MG/100ML; MG/100ML; MG/100ML; MG/100ML
9 INJECTION, SOLUTION INTRAVENOUS CONTINUOUS PRN
Status: DISCONTINUED | OUTPATIENT
Start: 2025-01-15 | End: 2025-01-15 | Stop reason: HOSPADM

## 2025-01-15 RX ADMIN — SODIUM CHLORIDE, POTASSIUM CHLORIDE, SODIUM LACTATE AND CALCIUM CHLORIDE 9 ML/HR: 600; 310; 30; 20 INJECTION, SOLUTION INTRAVENOUS at 14:07

## 2025-01-15 RX ADMIN — FENTANYL CITRATE 25 MCG: 50 INJECTION, SOLUTION INTRAMUSCULAR; INTRAVENOUS at 16:09

## 2025-01-15 RX ADMIN — BUPIVACAINE HYDROCHLORIDE 25 ML: 5 INJECTION, SOLUTION EPIDURAL; INTRACAUDAL; PERINEURAL at 15:48

## 2025-01-15 RX ADMIN — HYDROMORPHONE HYDROCHLORIDE 0.25 MG: 1 INJECTION, SOLUTION INTRAMUSCULAR; INTRAVENOUS; SUBCUTANEOUS at 17:10

## 2025-01-15 RX ADMIN — ACETAMINOPHEN 1000 MG: 500 TABLET ORAL at 14:05

## 2025-01-15 RX ADMIN — FENTANYL CITRATE 25 MCG: 50 INJECTION, SOLUTION INTRAMUSCULAR; INTRAVENOUS at 15:52

## 2025-01-15 RX ADMIN — DEXAMETHASONE SODIUM PHOSPHATE 4 MG: 4 INJECTION, SOLUTION INTRAMUSCULAR; INTRAVENOUS at 15:39

## 2025-01-15 RX ADMIN — SODIUM CHLORIDE, POTASSIUM CHLORIDE, SODIUM LACTATE AND CALCIUM CHLORIDE: 600; 310; 30; 20 INJECTION, SOLUTION INTRAVENOUS at 15:38

## 2025-01-15 RX ADMIN — HYDROMORPHONE HYDROCHLORIDE 0.5 MG: 1 INJECTION, SOLUTION INTRAMUSCULAR; INTRAVENOUS; SUBCUTANEOUS at 16:48

## 2025-01-15 RX ADMIN — ONDANSETRON 4 MG: 2 INJECTION INTRAMUSCULAR; INTRAVENOUS at 15:40

## 2025-01-15 RX ADMIN — PROPOFOL 50 MG: 10 INJECTION, EMULSION INTRAVENOUS at 15:50

## 2025-01-15 RX ADMIN — PROPOFOL 150 MG: 10 INJECTION, EMULSION INTRAVENOUS at 15:45

## 2025-01-15 RX ADMIN — BUPIVACAINE HYDROCHLORIDE 25 ML: 5 INJECTION, SOLUTION EPIDURAL; INTRACAUDAL; PERINEURAL at 15:54

## 2025-01-15 RX ADMIN — FENTANYL CITRATE 25 MCG: 50 INJECTION, SOLUTION INTRAMUSCULAR; INTRAVENOUS at 16:07

## 2025-01-15 RX ADMIN — LIDOCAINE HYDROCHLORIDE 60 MG: 20 INJECTION, SOLUTION EPIDURAL; INFILTRATION; INTRACAUDAL; PERINEURAL at 15:45

## 2025-01-15 RX ADMIN — FENTANYL CITRATE 25 MCG: 50 INJECTION, SOLUTION INTRAMUSCULAR; INTRAVENOUS at 16:01

## 2025-01-15 RX ADMIN — ONDANSETRON 4 MG: 2 INJECTION INTRAMUSCULAR; INTRAVENOUS at 17:52

## 2025-01-15 RX ADMIN — OXYCODONE 5 MG: 5 TABLET ORAL at 17:23

## 2025-01-15 RX ADMIN — KETOROLAC TROMETHAMINE 30 MG: 30 INJECTION, SOLUTION INTRAMUSCULAR; INTRAVENOUS at 16:27

## 2025-01-15 RX ADMIN — BUPIVACAINE HYDROCHLORIDE 30 ML: 5 INJECTION, SOLUTION EPIDURAL; INTRACAUDAL; PERINEURAL at 15:18

## 2025-01-15 RX ADMIN — HYDROMORPHONE HYDROCHLORIDE 0.25 MG: 1 INJECTION, SOLUTION INTRAMUSCULAR; INTRAVENOUS; SUBCUTANEOUS at 17:00

## 2025-01-15 NOTE — ANESTHESIA PROCEDURE NOTES
Peripheral Block      Patient reassessed immediately prior to procedure    Patient location during procedure: pre-op  Reason for block: at surgeon's request and post-op pain management  Preanesthetic Checklist  Completed: patient identified, IV checked, site marked, risks and benefits discussed, surgical consent, monitors and equipment checked, pre-op evaluation and timeout performed  Prep:  Pt Position: supine  Sterile barriers:alcohol skin prep, partial drape, cap, washed/disinfected hands, gloves and mask  Prep: ChloraPrep  Patient monitoring: blood pressure monitoring, continuous pulse oximetry and EKG  Procedure    Sedation: yes  Performed under: local infiltration  Guidance:ultrasound guided and nerve stimulator    ULTRASOUND INTERPRETATION.  Using ultrasound guidance a 22 G gauge needle was placed in close proximity to the brachial plexus nerve, at which point, under ultrasound guidance anesthetic was injected in the area of the nerve and spread of the anesthesia was seen on ultrasound in close proximity thereto.  There were no abnormalities seen on ultrasound; a digital image was taken; and the patient tolerated the procedure with no complications. Images:still images obtained, printed/placed on chart    Laterality:right  Block Type:infraclavicular  Injection Technique:single-shot  Needle Type:echogenic  Needle Gauge:22 G (2in)  Resistance on Injection: none    Medications Used: bupivacaine (PF) (MARCAINE) 0.5 % injection - Injection   30 mL - 1/15/2025 3:18:00 PM      Post Assessment  Injection Assessment: no paresthesia on injection, incremental injection and negative aspiration for heme  Patient Tolerance:comfortable throughout block  Complications:no  Additional Notes  The block or continuous infusion is requested by the referring physician for management of postoperative pain, or pain related to a procedure. Ultrasound guidance (deemed medically necessary). Painless injection, pt was awake and  conversant during the procedure without complications. Needle and surrounding structures visualized throughout procedure. No adverse reactions or complications seen during this period. Post-procedure image showed no signs of complication, and anatomy was consistent with an uncomplicated nerve blockade.  Performed by: Stepan Cordova MD

## 2025-01-15 NOTE — ANESTHESIA PREPROCEDURE EVALUATION
Anesthesia Evaluation     Patient summary reviewed and Nursing notes reviewed   no history of anesthetic complications:   NPO Solid Status: > 8 hours  NPO Liquid Status: > 2 hours           Airway   Mallampati: II  TM distance: >3 FB  Neck ROM: full  No difficulty expected  Dental - normal exam     Pulmonary - normal exam    breath sounds clear to auscultation  (+) asthma (controlled with advair),  Cardiovascular - negative cardio ROS and normal exam  Exercise tolerance: good (4-7 METS)    ECG reviewed  Rhythm: regular  Rate: normal    Hyperlipidemia: borderline.      Neuro/Psych- negative ROS  GI/Hepatic/Renal/Endo - negative ROS     Musculoskeletal (-) negative ROS    Abdominal    Substance History - negative use     OB/GYN negative ob/gyn ROS         Other - negative ROS       ROS/Med Hx Other: PAT Nursing Notes unavailable.     EKG - SR              Anesthesia Plan    ASA 2     general     (Patient understands anesthesia not responsible for dental damage.)  intravenous induction     Anesthetic plan, risks, benefits, and alternatives have been provided, discussed and informed consent has been obtained with: patient.    Use of blood products discussed with patient .    Plan discussed with CRNA.    CODE STATUS:

## 2025-01-15 NOTE — ANESTHESIA POSTPROCEDURE EVALUATION
Patient: Kavya De La Cruz    Procedure Summary       Date: 01/15/25 Room / Location: [facility] OR 03 / [facility] MAIN OR    Anesthesia Start: 1539 Anesthesia Stop: 1638    Procedure: OPEN REDUCTION INTERNAL FIXATION DISTAL RADIUS (Right: Wrist) Diagnosis:       Closed fracture of distal end of right radius, unspecified fracture morphology, initial encounter      (Closed fracture of distal end of right radius, unspecified fracture morphology, initial encounter [S52.501A])    Surgeons: Yousuf Rosado MD Provider: Stepan Cordova MD    Anesthesia Type: general ASA Status: 2            Anesthesia Type: general    Vitals  Vitals Value Taken Time   /75 01/15/25 1727   Temp 36.2 °C (97.1 °F) 01/15/25 1640   Pulse 73 01/15/25 1730   Resp 16 01/15/25 1710   SpO2 99 % 01/15/25 1730   Vitals shown include unfiled device data.        Post Anesthesia Care and Evaluation    Patient location during evaluation: bedside  Patient participation: complete - patient participated  Level of consciousness: awake and alert  Pain management: adequate    Airway patency: patent  Anesthetic complications: No anesthetic complications  PONV Status: none  Cardiovascular status: acceptable  Respiratory status: acceptable  Hydration status: acceptable    Comments: An Anesthesiologist personally participated in the most demanding procedures (including induction and emergence if applicable) in the anesthesia plan, monitored the course of anesthesia administration at frequent intervals and remained physically present and available for immediate diagnosis and treatment of emergencies.    
N/A

## 2025-01-15 NOTE — NURSING NOTE
"Pt came from OR with splint/dressing and sling.  Pt has been medicated for pain and reports it \"just hurts\". Dr. Cordova spoke with pt about redoing the block and she states she just wants to go home and lay down.   "

## 2025-01-15 NOTE — DISCHARGE INSTRUCTIONS
DISCHARGE INSTRUCTIONS  ORTHOPEDICS      For your surgery you had:  General anesthesia (you may have a sore throat for the first 24 hours)  Local anesthesia  You may experience dizziness, drowsiness, or light-headedness for several hours following surgery  Do not stay alone today or tonight.  Limit your activity for 24 hours.  Resume your diet slowly.  Follow whatever special dietary instructions you may have been given by the doctor.  You should not drive or operate machinery or drink alcohol for 24 hours or while you are taking pain medication.  You should not sign any legally binding documents.  If you had an axillary or regional block, you will not have control of the involved limb for up to 12 hours.  Protect the arm with a sling or follow your physician's specific instructions.  Keep dressing clean, dry, intact.  You may shower or bathe: tomorrow keeping dressing dry.  Sleep with the injured part elevated on a pillow.  Medications per physician's instructions as indicated on Discharge Medication Information Sheet.  Follow verbal instructions of your doctor.  Carry the upper arm in a sling so that the hand and wrist are above the level of the heart.  Exercise fingers for 10 minutes every hour while awake. Ice bag to injured area for 72 hours.  Apply 20 minutes on - 20 minutes off.  Never place ice directly on skin or cast.    Avoid getting cast or dressing wet.  In addition to these instructions, follow the discharge instructions on postoperative arthroscopic surgery.    SPECIAL INSTRUCTIONS:  Follow any verbal instructions from Dr. Rosado.    Last dose of pain medication was given at:   Tylenol 1000 mg given at 4:27. Do not exceed 4000 mg in a 24 hour period.  Toradol given at 2:05. May have ibuprofen at 6:05 pm if able and needed.  Oxycodone given at 5:23. May have Norco at 9:23 pm.      NOTIFY THE PHYSICIAN IF YOU EXPERIENCE:  Numbness of fingers.  Inability to move fingers.  Extreme coldness, paleness  or blue dis-coloration of fingers.  Excessive swelling of affected surgical site or swelling that causes the cast to rub or cut into skin.  Pain unrelieved by pain medication  Nausea/vomiting not relieved by prescribed medication  Unable to urinate in 6 hours after surgery  Temperature greater than 101 degree Fahrenheit or chills  If unable to reach your doctor, please go to the closest emergency room

## 2025-01-15 NOTE — OP NOTE
WRIST OPEN REDUCTION INTERNAL FIXATION  Procedure Report    Patient Name:  Kavya De La Cruz  YOB: 1955    Date of Surgery:  1/15/2025     Indications: The patient is a 69-year-old female who fell and sustained a displaced distal radius fracture and ulnar styloid fracture.  She wishes to undergo operative treatment.  Risks and benefits of surgery were discussed.  Risk of bleeding, infection, damage neurovascular structures, heart attack, stroke, DVT/PE, anesthesia complications including death, malunion, nonunion, symptomatic hardware, among others were discussed.  Informed consent was obtained and she wished to proceed.    Pre-op Diagnosis:   Closed fracture of distal end of right radius, unspecified fracture morphology, initial encounter [S52.501A]       Post-Op Diagnosis Codes:     * Closed fracture of distal end of right radius, unspecified fracture morphology, initial encounter [S52.501A]    Procedure/CPT® Codes:      Procedure(s):  OPEN REDUCTION INTERNAL FIXATION RIGHT DISTAL RADIUS fracture    Staff:  Surgeon(s):  Yousuf Rosado MD    Assistant: Kentrell Alvarez PA    Anesthesia: Choice    Estimated Blood Loss: 5 mL    Implants:    Jac VariAx distal radius locking plate    Specimen:          None        Findings: Extra-articular distal radius fracture    Complications: None    Description of Procedure: The operative site was marked in the preoperative holding area.  The patient was brought to the operating room and placed supine on the operating room with a hand table extension.  General anesthesia was given.  Preoperative antibiotic was administered.  The upper extremity was prepped and draped in usual sterile fashion after a nonsterile tourniquet was applied.  Formal timeout was performed.  Esmarch was used to exsanguinate the limb and tourniquet was inflated.  A longitudinal incision was centered over the flexor carpi radialis tendon.  Subcutaneous tissues and fascia were  divided. The tendon was retracted ulnarly.  The fascia below the tendon was incised and the FPL musculature was retracted ulnarly as well.  The pronator quadratus muscle was identified.  An “L- shaped” incision was made in the muscle on the radial border and then distally.  This was elevated and the fracture line was identified.  The fracture was reduced in anatomic position. A distal radius plate was placed on the distal radius in the appropriate position with the assistance of the fluoroscopic viewer.  A nonlocking screw secured the plate to the shaft of the radius.  We then inserted distal locking screw under fluoroscopic guidance. We ensured on multiple fluoroscopic views these were not penetrating the articular surface and were in good position.  The fracture was well reduced.  At this time, we then drilled, measured, and inserted the proximal locking screws of the plate. Final fluoroscopic images were taken.  The wrist was taken through a range of motion.  There was no mechanical crepitus or any signs of intraarticular screw penetration.  The DRUJ was assessed and was found to be stable.  At this point the subcutaneous tissues were irrigated.  The tourniquet was released.  The subcutaneous tissues were closed with 3-0 Vicryl and the skin with nylon suture. A well-padded volar splint was placed. The patient awoke from anesthesia in stable condition.  There were no complications.  All counts were correct.       Assistant: Kentrell Alvarez PA  was responsible for performing the following activities: Retraction, Suction, Irrigation, and Placing Dressing and their skilled assistance was necessary for the success of this case.    Yousuf Rosado MD     Date: 1/15/2025  Time: 16:46 EST

## 2025-01-15 NOTE — ANESTHESIA PROCEDURE NOTES
Peripheral Block      Patient reassessed immediately prior to procedure    Patient location during procedure: pre-op  Reason for block: at surgeon's request and post-op pain management  Preanesthetic Checklist  Completed: patient identified, IV checked, site marked, risks and benefits discussed, surgical consent, monitors and equipment checked, pre-op evaluation and timeout performed  Prep:  Pt Position: supine  Sterile barriers:alcohol skin prep, cap, partial drape, washed/disinfected hands, gloves, mask and sterile barriers  Prep: ChloraPrep  Patient monitoring: blood pressure monitoring, continuous pulse oximetry and EKG  Procedure    Sedation: yes  Performed under: local infiltration  Guidance:ultrasound guided and nerve stimulator    ULTRASOUND INTERPRETATION.  Using ultrasound guidance a 22 G gauge needle was placed in close proximity to the brachial plexus nerve, at which point, under ultrasound guidance anesthetic was injected in the area of the nerve and spread of the anesthesia was seen on ultrasound in close proximity thereto.  There were no abnormalities seen on ultrasound; a digital image was taken; and the patient tolerated the procedure with no complications. Images:still images obtained, printed/placed on chart    Block Type:supraclavicular  Injection Technique:single-shot  Needle Type:echogenic  Needle Gauge:22 G (2in)  Resistance on Injection: none          Post Assessment  Injection Assessment: negative aspiration for heme, no paresthesia on injection and incremental injection  Patient Tolerance:comfortable throughout block  Complications:no  Additional Notes  The block or continuous infusion is requested by the referring physician for management of postoperative pain, or pain related to a procedure. Ultrasound guidance (deemed medically necessary). Painless injection, pt was awake and conversant during the procedure without complications. Needle and surrounding structures visualized throughout  procedure. No adverse reactions or complications seen during this period. Post-procedure image showed no signs of complication, and anatomy was consistent with an uncomplicated nerve blockade.  Performed by: Godwin Slaughter MD

## 2025-01-20 LAB
QT INTERVAL: 376 MS
QTC INTERVAL: 408 MS

## 2025-01-28 ENCOUNTER — OFFICE VISIT (OUTPATIENT)
Dept: ORTHOPEDIC SURGERY | Facility: CLINIC | Age: 70
End: 2025-01-28
Payer: MEDICARE

## 2025-01-28 VITALS
HEIGHT: 65 IN | SYSTOLIC BLOOD PRESSURE: 171 MMHG | WEIGHT: 147 LBS | HEART RATE: 70 BPM | BODY MASS INDEX: 24.49 KG/M2 | OXYGEN SATURATION: 97 % | DIASTOLIC BLOOD PRESSURE: 84 MMHG

## 2025-01-28 DIAGNOSIS — Z47.89 AFTERCARE FOLLOWING SURGERY OF THE MUSCULOSKELETAL SYSTEM: Primary | ICD-10-CM

## 2025-01-28 DIAGNOSIS — M25.531 RIGHT WRIST PAIN: ICD-10-CM

## 2025-01-28 PROCEDURE — 1160F RVW MEDS BY RX/DR IN RCRD: CPT | Performed by: PHYSICIAN ASSISTANT

## 2025-01-28 PROCEDURE — 1159F MED LIST DOCD IN RCRD: CPT | Performed by: PHYSICIAN ASSISTANT

## 2025-01-28 PROCEDURE — 99024 POSTOP FOLLOW-UP VISIT: CPT | Performed by: PHYSICIAN ASSISTANT

## 2025-01-28 NOTE — PROGRESS NOTES
"Chief Complaint  Follow-up of the Right Wrist and Suture / Staple Removal    Subjective          History of Present Illness      Kavya De La Cruz is a 69 y.o. female  presents to [facility] for     Patient presents with her  for 2-week postoperative evaluation of ORIF right distal radius fracture, 1/15/2025.  She also has an ulnar styloid fracture.  Patient states she has already returned to work she works in administrative type job at a desk.  She presented in her splint splint was removed for x-rays and physical exam, sutures were removed.  She states she has been working her fingers and using her hand.  She states she has already stopped the pain medication she also states she is already returned to driving.      No Known Allergies     Social History     Socioeconomic History    Marital status:    Tobacco Use    Smoking status: Never    Smokeless tobacco: Never   Vaping Use    Vaping status: Never Used   Substance and Sexual Activity    Alcohol use: Never    Drug use: Never    Sexual activity: Defer        REVIEW OF SYSTEMS    Constitutional: Awake alert and oriented x3, no acute distress, denies fevers, chills, weight loss  Respiratory: No respiratory distress  Vascular: Brisk cap refill, Intact distal pulses, No cyanosis, compartments soft with no signs or symptoms of compartment syndrome or DVT.   Cardiovascular: Denies chest pain, shortness of breath  Skin: Denies rashes, acute skin changes  Neurologic: Denies headache, loss of consciousness  MSK: Right wrist pain      Objective   Vital Signs:   /84   Pulse 70   Ht 165.1 cm (65\")   Wt 66.7 kg (147 lb)   SpO2 97%   BMI 24.46 kg/m²     Body mass index is 24.46 kg/m².    Physical Exam       Right wrist: Incision is healing well, no erythema, no drainage or dehiscence, no signs of infection, patient will wiggle fingers and thumb, makes a near full fist wrist range of motion limited secondary stiffness and pain, " neurovascularly intact      Procedures    Imaging Results (Most Recent)       Procedure Component Value Units Date/Time    XR Wrist 2 View Right [[id number]] Resulted: 01/28/25 1101     Updated: 01/28/25 1101    Narrative:      View:AP/Lateral view(s)  Site: Right wrist  Indication: Right wrist pain  Study: X-rays ordered, taken in the office, and reviewed today  X-ray: Good healing of distal radius fracture and ulnar styloid fracture   fracture alignment remains stable compared to previous study with intact   plate and screws.  Comparative data: Previous studies             Result Review :   The following data was reviewed by: YARED Lam on 01/28/2025:  Data reviewed : Radiologic studies reviewed by me with the patient and her family              Assessment and Plan    Diagnoses and all orders for this visit:    1. Aftercare following surgery of OPEN REDUCTION INTERNAL FIXATION RIGHT DISTAL RADIUS fracture 1/15/25 (Primary)  -     Ambulatory Referral to Occupational Therapy for Evaluation & Treatment    2. Right wrist pain  -     XR Wrist 2 View Right  -     Ambulatory Referral to Occupational Therapy for Evaluation & Treatment        Reviewed x-rays with the patient and her family they were advised patient would benefit from occupational therapy she was given order for this she was also advised to wear the brace with activity, avoid heavy lift push pull activity.  Sutures/staples removed in office today. Steri-strips applied. Discussed incision care/hygiene. May shower, but do not submerge in water until fully healed.  Advised patient that if any concerning symptoms regarding incision appearance occur that they should call us right away, patient expressed understanding.  Follow-up 4 weeks for recheck, x-ray at next visit    Call or return if worsening symptoms.    Follow Up   Return in about 4 weeks (around 2/25/2025).  Patient was given instructions and counseling regarding her condition or for  health maintenance advice. Please see specific information pulled into the AVS if appropriate.       EMR Dragon/Transcription disclaimer:  Part of this note may be an electronic transcription/translation of spoken language to printed text using the Dragon Dictation System

## 2025-01-30 ENCOUNTER — TREATMENT (OUTPATIENT)
Dept: PHYSICAL THERAPY | Facility: CLINIC | Age: 70
End: 2025-01-30
Payer: MEDICARE

## 2025-01-30 DIAGNOSIS — M25.631 STIFFNESS OF RIGHT WRIST JOINT: ICD-10-CM

## 2025-01-30 DIAGNOSIS — S52.501D CLOSED FRACTURE OF DISTAL END OF RIGHT RADIUS WITH ROUTINE HEALING, UNSPECIFIED FRACTURE MORPHOLOGY, SUBSEQUENT ENCOUNTER: Primary | ICD-10-CM

## 2025-01-30 NOTE — PROGRESS NOTES
Outpatient Occupational Therapy Ortho Initial Evaluation                                 [address] [place] [address]    Patient: Kavya De La Cruz   : 1955  Diagnosis/ICD-10 Code:  Closed fracture of distal end of right radius with routine healing, unspecified fracture morphology, subsequent encounter [S52.501D]  Referring practitioner: Kentrell Cabrera  Date of Initial Visit: 2025  Today's Date: 2025  Patient seen for 1 sessions               Subjective Questionnaire: QuickDASH:     Past Medical History: L leg fracture with guillaume placement     Subjective Evaluation    History of Present Illness  Date of onset: 1/10/2025  Date of surgery: 1/15/2025  Mechanism of injury: Pt reports slipping on the ice and fracturing her R wrist on 1/10/25. Pt had ORIF on 1/15/25. Pt is referred for Occupational Therapy for evaluation and treatment. Pt reports some stiffness in her wrist and tingling in thumb, index and middle finger. She reports some morning stiffness. Pt is able to drive and knit now.      Patient Occupation:  at [employer] and Work Restrictions: 1 lbPain  Current pain ratin  At worst pain ratin  Location: R wrist  Quality: discomfort and dull ache    Social Support  Lives with: spouse    Hand dominance: right    Diagnostic Tests  X-ray: abnormal    Patient Goals  Patient goals for therapy: increased motion and return to sport/leisure activities             Objective          Observations     Right Wrist/Hand   Positive for edema.     Additional Wrist/Hand Observation Details  Pt has healed 7 cm incision over her volar wrist with 6 steri strips in tact    Tenderness     Additional Tenderness Details  No tenderness to palpation    Neurological Testing     Sensation     Wrist/Hand     Right   Intact: light touch    Additional Neurological Details  Tingling in her thumb, index and middle fingers    Active Range of Motion     Right Shoulder   Normal  active range of motion    Right Elbow   Normal active range of motion    Right Wrist   Wrist flexion: 40 degrees   Wrist extension: 20 degrees   Radial deviation: 10 degrees with pain  Ulnar deviation: 35 degrees     Additional Active Range of Motion Details  Full composite fist and thumb opposition    Strength/Myotome Testing     Left Wrist/Hand      (2nd hand position)     Trial 1: 40 lbs    Trial 2: 36 lbs    Trial 3: 42 lbs    Average: 39.33 lbs    Additional Strength Details  R deferred    Tests     Right Wrist/Hand   Negative Tinel's sign (medial nerve).     Swelling     Left Wrist/Hand   Circumference wrist: 15 cm    Right Wrist/Hand   Circumference wrist: 16.5 cm          Assessment & Plan       Assessment  Impairments: abnormal coordination, abnormal or restricted ROM, activity intolerance, impaired physical strength and pain with function   Other impairment: difficulty styling hair, washing back  Functional limitations: carrying objects, lifting, pulling and pushing   Prognosis: good    Goals  Plan Goals: 1. The patient complains of pain in the R wrist.                  LTG 1: 12 weeks:  The patient will report a pain rating of 2/10 or better in order to improve tolerance to performance of activities of daily living including styling hair.                                  STATUS:  New                  STG 1a: 6 weeks:  The patient will report a pain rating of 4/10 or better at worst.                                   STATUS:  New  2. The patient has limited ROM of the R wrist.                  LTG 2: 12 weeks:  The patient will demonstrate 60 degrees of wrist flex/ext to allow the patient to style her hair.                                  STATUS:  New                   STG 2a: 6 weeks:  The patient will demonstrate 50 degrees of wrist flex/ext.                                  STATUS:  New                             3. The patient has limited strength of the R hand.                  LTG 3: 12 weeks:   The patient will demonstrate 45 lbs R  strength in order to open jars/bottles with R hand.                                  STATUS:  New                  STG 3a: 6 weeks:  The patient will demonstrate good tolerance to  strength testing.                                  STATUS:  New  4. Carrying, Moving, and Handling Objects Functional Limitation                                    LTG 4: 12 weeks:  The patient will achieve a score of 11/55 on the Quick DASH.                                  STATUS:  New                                        Plan  Planned modality interventions: TENS, thermotherapy (hydrocollator packs) and thermotherapy (paraffin bath)  Planned therapy interventions: manual therapy, functional ROM exercises, fine motor coordination training, flexibility, stretching, strengthening, home exercise program, neuromuscular re-education, soft tissue mobilization and therapeutic activities  Frequency: 2x week  Duration in weeks: 12  Treatment plan discussed with: patient          ICD-10-CM ICD-9-CM   1. Closed fracture of distal end of right radius with routine healing, unspecified fracture morphology, subsequent encounter  S52.501D V54.12   2. Stiffness of right wrist joint  M25.631 719.53       Patient is indicated for skilled occupational therapy services.    History # of Personal Factors and/or Comorbidities: LOW (0)  Examination of Body System(s): # of elements: LOW (1-2)  Clinical Presentation: STABLE   Clinical Decision Making: LOW      Evaluation:  Low Complexity:    30     mins  15996;  Mod Complexity:    0     mins  20243;  High Complexity:    0     mins  97335;    Timed:  Manual Therapy:    0     mins  96722;  Therapeutic Exercise:    15     mins  54796;   Therapeutic Activity:    0     mins  11908;  Neuromuscular Davian:    0    mins  43933;    Ultrasound:     0     mins  01808;    Electrical Stimulation:    0     mins  23906 ( );      Timed Treatment:   15   mins   Total  Treatment:     45   mins      OT SIGNATURE: Georgette Duarte OTR/L    Electronically signed   License number [id number]   DATE TREATMENT INITIATED: 1/30/2025    Initial Certification  Certification Period: 1/30/2025 thru 4/29/2025  I certify that the therapy services are furnished while this patient is under my care.  The services outlined above are required by this patient, and will be reviewed every 90 days.     PHYSICIAN: Kentrell Alvarez PA  NPI: [id number]                                          DATE:     Please sign and return via fax to  [phone]   Thank you, [facility].

## 2025-02-05 ENCOUNTER — TREATMENT (OUTPATIENT)
Dept: PHYSICAL THERAPY | Facility: CLINIC | Age: 70
End: 2025-02-05
Payer: MEDICARE

## 2025-02-05 DIAGNOSIS — S52.501D CLOSED FRACTURE OF DISTAL END OF RIGHT RADIUS WITH ROUTINE HEALING, UNSPECIFIED FRACTURE MORPHOLOGY, SUBSEQUENT ENCOUNTER: Primary | ICD-10-CM

## 2025-02-05 DIAGNOSIS — M25.631 STIFFNESS OF RIGHT WRIST JOINT: ICD-10-CM

## 2025-02-05 NOTE — PROGRESS NOTES
Occupational Therapy Daily Treatment Note  [address] [place] [address]      Patient: Kavya De La Cruz   : 1955  Referring practitioner: Kentrell Connolly*  Date of Initial Visit: Type: THERAPY  Noted: 2025  Today's Date: 2025  Patient seen for 2 sessions         Subjective   Kavya De La Cruz reports: I still having lots of numbness and tingling and by the end of the day my hand is swollen.    Objective   See Exercise, Manual, and Modality Logs for complete treatment.   Scar massage added today with good tolerance. Median nerve glides also added to address numbness and tingling.    Assessment/Plan    Visit Diagnoses:    ICD-10-CM ICD-9-CM   1. Closed fracture of distal end of right radius with routine healing, unspecified fracture morphology, subsequent encounter  S52.501D V54.12   2. Stiffness of right wrist joint  M25.631 719.53       Continue per POC         Timed:  Manual Therapy:    8     mins  98808;  Therapeutic Exercise:    10     mins  71400;     Therapeutic Activity:    0     mins  16654;  Ultrasound:     0     mins  11015;    Electrical Stimulation:    0     mins 55318;  Neuromuscular Davian:    10    mins  90393;      Timed Treatment:   28   mins   Total Treatment:     28   min    Georgette Duarte OTR/L  Occupational Therapist    Electronically signed   License number [id number]

## 2025-02-07 ENCOUNTER — TREATMENT (OUTPATIENT)
Dept: PHYSICAL THERAPY | Facility: CLINIC | Age: 70
End: 2025-02-07
Payer: MEDICARE

## 2025-02-07 DIAGNOSIS — S52.501D CLOSED FRACTURE OF DISTAL END OF RIGHT RADIUS WITH ROUTINE HEALING, UNSPECIFIED FRACTURE MORPHOLOGY, SUBSEQUENT ENCOUNTER: Primary | ICD-10-CM

## 2025-02-07 DIAGNOSIS — M25.631 STIFFNESS OF RIGHT WRIST JOINT: ICD-10-CM

## 2025-02-07 NOTE — PROGRESS NOTES
Occupational Therapy Daily Treatment Note  [address] [place] [address]      Patient: Kavya De La Cruz   : 1955  Referring practitioner: Kentrell Connolly*  Date of Initial Visit: Type: THERAPY  Noted: 2025  Today's Date: 2025  Patient seen for 3 sessions         Subjective Evaluation    Pain  Current pain ratin  Location: R wrist         Kavya De La Cruz reports: it is hurting more today and more tingling. It is a lot more swollen too.    Objective   See Exercise, Manual, and Modality Logs for complete treatment.   Fluidotherapy added today for pain control and desensitization with good tolerance  Pt reports numbness in her thumb when performing wrist maze and at home holding pencil and when sewing.   Assessment/Plan    Visit Diagnoses:    ICD-10-CM ICD-9-CM   1. Closed fracture of distal end of right radius with routine healing, unspecified fracture morphology, subsequent encounter  S52.501D V54.12   2. Stiffness of right wrist joint  M25.631 719.53       Continue per POC         Timed:  Manual Therapy:    0     mins  04439;  Therapeutic Exercise:    10     mins  72264;     Therapeutic Activity:    5     mins  62190;  Ultrasound:     0     mins  24521;    Electrical Stimulation:    0     mins 06791;  Neuromuscular Davian:    15    mins  29411;      Timed Treatment:   30   mins   Total Treatment:     30   min    ANASTASIA Briceon/L  Occupational Therapist    Electronically signed   License number [id number]

## 2025-02-12 ENCOUNTER — TREATMENT (OUTPATIENT)
Dept: PHYSICAL THERAPY | Facility: CLINIC | Age: 70
End: 2025-02-12
Payer: MEDICARE

## 2025-02-12 DIAGNOSIS — S52.501D CLOSED FRACTURE OF DISTAL END OF RIGHT RADIUS WITH ROUTINE HEALING, UNSPECIFIED FRACTURE MORPHOLOGY, SUBSEQUENT ENCOUNTER: Primary | ICD-10-CM

## 2025-02-12 DIAGNOSIS — M25.631 STIFFNESS OF RIGHT WRIST JOINT: ICD-10-CM

## 2025-02-12 NOTE — PROGRESS NOTES
Occupational Therapy Daily Treatment Note  [address] [place] [address]      Patient: Kavya De La Cruz   : 1955  Referring practitioner: Kentrell Connolly*  Date of Initial Visit: Type: THERAPY  Noted: 2025  Today's Date: 2025  Patient seen for 4 sessions         Subjective   Kavya De La Cruz reports: it is hurting on the top even when you touch it is sore. The tingling is getting worse.    Objective          Observations     Right Wrist/Hand   Positive for edema.     Additional Wrist/Hand Observation Details  Pt has healed 7 cm incision over her volar wrist     Tenderness     Additional Tenderness Details  No tenderness to palpation    Neurological Testing     Sensation     Wrist/Hand     Right   Intact: light touch    Additional Neurological Details  Tingling in her thumb, index and middle fingers    Active Range of Motion     Right Shoulder   Normal active range of motion    Right Elbow   Normal active range of motion    Right Wrist   Wrist flexion: 40 degrees   Wrist extension: 55 degrees   Radial deviation: 10 degrees with pain  Ulnar deviation: 35 degrees     Additional Active Range of Motion Details  Full composite fist and thumb opposition    Strength/Myotome Testing     Left Wrist/Hand      (2nd hand position)     Trial 1: 40 lbs    Trial 2: 36 lbs    Trial 3: 42 lbs    Average: 39.33 lbs    Additional Strength Details  R deferred    Tests     Right Wrist/Hand   Negative Tinel's sign (medial nerve).     Swelling     Left Wrist/Hand   Circumference wrist: 15 cm    Right Wrist/Hand   Circumference wrist: 16.5 cm      See Exercise, Manual, and Modality Logs for complete treatment.     Pt has increased tingling with exercises so avoided gripping and wrist flexion.  Assessment/Plan    Visit Diagnoses:    ICD-10-CM ICD-9-CM   1. Closed fracture of distal end of right radius with routine healing, unspecified fracture morphology, subsequent encounter  S52.501D V54.12   2. Stiffness of right  wrist joint  M25.631 719.53       Continue per POC         Timed:  Manual Therapy:    8     mins  27702;  Therapeutic Exercise:    10     mins  99000;     Therapeutic Activity:    5     mins  40219;  Ultrasound:     0     mins  68114;    Electrical Stimulation:    0     mins 78892;  Neuromuscular Davian:    10    mins  07537;      Timed Treatment:   33   mins   Total Treatment:     33   min    ANASTASIA Briceno/L  Occupational Therapist    Electronically signed   License number [id number]

## 2025-02-14 ENCOUNTER — TREATMENT (OUTPATIENT)
Dept: PHYSICAL THERAPY | Facility: CLINIC | Age: 70
End: 2025-02-14
Payer: MEDICARE

## 2025-02-14 DIAGNOSIS — S52.501D CLOSED FRACTURE OF DISTAL END OF RIGHT RADIUS WITH ROUTINE HEALING, UNSPECIFIED FRACTURE MORPHOLOGY, SUBSEQUENT ENCOUNTER: Primary | ICD-10-CM

## 2025-02-14 DIAGNOSIS — M25.631 STIFFNESS OF RIGHT WRIST JOINT: ICD-10-CM

## 2025-02-14 NOTE — PROGRESS NOTES
Occupational Therapy Daily Treatment Note  [address] [place] [address]      Patient: Kavya De La Cruz   : 1955  Referring practitioner: Kentrell Connolly*  Date of Initial Visit: Type: THERAPY  Noted: 2025  Today's Date: 2025  Patient seen for 5 sessions         Subjective   Kavya De La Cruz reports: it feels like there is something in there bothering it and making it itch.    Objective   See Exercise, Manual, and Modality Logs for complete treatment.   Pt able to complete all exercises today and added exercises without complaints of increased tingling.    Assessment/Plan    Visit Diagnoses:    ICD-10-CM ICD-9-CM   1. Closed fracture of distal end of right radius with routine healing, unspecified fracture morphology, subsequent encounter  S52.501D V54.12   2. Stiffness of right wrist joint  M25.631 719.53       Continue per POC         Timed:  Manual Therapy:    0     mins  18745;  Therapeutic Exercise:    15     mins  51537;     Therapeutic Activity:    8     mins  98503;  Ultrasound:     0     mins  48121;    Electrical Stimulation:    0     mins 81829;  Neuromuscular Davian:    15    mins  95702;    Timed Treatment:   38   mins   Total Treatment:     38   min    Georgette Duarte OTR/L  Occupational Therapist    Electronically signed   License number [id number]

## 2025-02-21 ENCOUNTER — TREATMENT (OUTPATIENT)
Dept: PHYSICAL THERAPY | Facility: CLINIC | Age: 70
End: 2025-02-21
Payer: MEDICARE

## 2025-02-21 DIAGNOSIS — M25.631 STIFFNESS OF RIGHT WRIST JOINT: ICD-10-CM

## 2025-02-21 DIAGNOSIS — S52.501D CLOSED FRACTURE OF DISTAL END OF RIGHT RADIUS WITH ROUTINE HEALING, UNSPECIFIED FRACTURE MORPHOLOGY, SUBSEQUENT ENCOUNTER: Primary | ICD-10-CM

## 2025-02-21 NOTE — PROGRESS NOTES
Occupational Therapy Daily Treatment Note  [address] [place] [address]      Patient: Kavya De La Cruz   : 1955  Referring practitioner: Kentrell Connolly*  Date of Initial Visit: Type: THERAPY  Noted: 2025  Today's Date: 2025  Patient seen for 6 sessions         Subjective   Kavya De La Cruz reports: I still can't hold anything in my palm. Sometimes the tingling is better, sometimes worse.    Objective          Observations     Right Wrist/Hand   Positive for edema.     Additional Wrist/Hand Observation Details  Pt has healed 7 cm incision over her volar wrist     Tenderness     Additional Tenderness Details  No tenderness to palpation    Neurological Testing     Sensation     Wrist/Hand     Right   Intact: light touch    Additional Neurological Details  Tingling in her thumb, index and middle fingers    Active Range of Motion     Right Shoulder   Normal active range of motion    Right Elbow   Normal active range of motion    Right Wrist   Wrist flexion: 55 degrees   Wrist extension: 55 degrees   Radial deviation: 10 degrees with pain  Ulnar deviation: 35 degrees     Additional Active Range of Motion Details  Full composite fist and thumb opposition    Strength/Myotome Testing     Left Wrist/Hand      (2nd hand position)     Trial 1: 40 lbs    Trial 2: 36 lbs    Trial 3: 42 lbs    Average: 39.33 lbs    Tests     Right Wrist/Hand   Negative Tinel's sign (medial nerve).     Swelling     Left Wrist/Hand   Circumference wrist: 15 cm    Right Wrist/Hand   Circumference wrist: 16.5 cm      See Exercise, Manual, and Modality Logs for complete treatment.       Assessment/Plan    Visit Diagnoses:    ICD-10-CM ICD-9-CM   1. Closed fracture of distal end of right radius with routine healing, unspecified fracture morphology, subsequent encounter  S52.501D V54.12   2. Stiffness of right wrist joint  M25.631 719.53       Pt has significant increase in wrist flexion today. Continue per POC          Timed:  Manual Therapy:    5     mins  35508;  Therapeutic Exercise:    10     mins  96943;     Therapeutic Activity:    10     mins  22549;  Ultrasound:     0     mins  53572;    Electrical Stimulation:    0     mins 58754;  Neuromuscular Davian:    15    mins  33386;      Timed Treatment:   40   mins   Total Treatment:     40   min    Georgette Duarte OTR/L  Occupational Therapist    Electronically signed   License number [id number]

## 2025-02-26 ENCOUNTER — TREATMENT (OUTPATIENT)
Dept: PHYSICAL THERAPY | Facility: CLINIC | Age: 70
End: 2025-02-26
Payer: MEDICARE

## 2025-02-26 DIAGNOSIS — M25.631 STIFFNESS OF RIGHT WRIST JOINT: ICD-10-CM

## 2025-02-26 DIAGNOSIS — S52.501D CLOSED FRACTURE OF DISTAL END OF RIGHT RADIUS WITH ROUTINE HEALING, UNSPECIFIED FRACTURE MORPHOLOGY, SUBSEQUENT ENCOUNTER: Primary | ICD-10-CM

## 2025-02-26 NOTE — PROGRESS NOTES
Occupational Therapy Daily Treatment Note  [address] [place] [address]      Patient: Kavya De La Cruz   : 1955  Referring practitioner: Kentrell Connolly*  Date of Initial Visit: Type: THERAPY  Noted: 2025  Today's Date: 2025  Patient seen for 7 sessions         Subjective   Kavya De La Cruz reports: I had a pain last night and felt a pop when I was brushing my teeth.     Objective   See Exercise, Manual, and Modality Logs for complete treatment.   Pt tolerated treatment well for range of motion, desensitization, and strengthening.    Assessment/Plan    Visit Diagnoses:    ICD-10-CM ICD-9-CM   1. Closed fracture of distal end of right radius with routine healing, unspecified fracture morphology, subsequent encounter  S52.501D V54.12   2. Stiffness of right wrist joint  M25.631 719.53       Continue per POC         Timed:  Manual Therapy:    0     mins  76678;  Therapeutic Exercise:    15     mins  82460;     Therapeutic Activity:    10     mins  33955;  Ultrasound:     0     mins  96071;    Electrical Stimulation:    0     mins 23086;  Neuromuscular Davian:    15    mins  27619;      Timed Treatment:   40   mins   Total Treatment:     40   min    ANASTASIA Briceno/L  Occupational Therapist    Electronically signed   License number [id number]

## 2025-02-28 ENCOUNTER — TREATMENT (OUTPATIENT)
Dept: PHYSICAL THERAPY | Facility: CLINIC | Age: 70
End: 2025-02-28
Payer: MEDICARE

## 2025-02-28 DIAGNOSIS — S52.501D CLOSED FRACTURE OF DISTAL END OF RIGHT RADIUS WITH ROUTINE HEALING, UNSPECIFIED FRACTURE MORPHOLOGY, SUBSEQUENT ENCOUNTER: Primary | ICD-10-CM

## 2025-02-28 DIAGNOSIS — M25.631 STIFFNESS OF RIGHT WRIST JOINT: ICD-10-CM

## 2025-02-28 NOTE — PROGRESS NOTES
Progress Note  [address] [place] [address]      Patient: Kavya De La Cruz   : 1955  Referring practitioner: Kentrell Connolly*  Date of Initial Visit: Type: THERAPY  Noted: 2025  Today's Date: 2025  Patient seen for 8 sessions         Subjective Evaluation    Pain  Current pain ratin  At worst pain ratin  Location: R wrist       Kavya De La Cruz reports: it still tingles and still goes numb. It is always bad first thing in the morning pain and stiffness.    Objective          Observations     Right Wrist/Hand   Positive for edema.     Additional Wrist/Hand Observation Details  Pt has healed 7 cm incision over her volar wrist     Tenderness     Additional Tenderness Details  No tenderness to palpation    Neurological Testing     Sensation     Wrist/Hand     Right   Paresthesia: light touch    Additional Neurological Details  Tingling in her thumb, index and middle fingers has not changed since IE    Active Range of Motion     Right Shoulder   Normal active range of motion    Right Elbow   Normal active range of motion    Right Wrist   Wrist flexion: 60 degrees   Wrist extension: 60 degrees   Radial deviation: 10 degrees with pain  Ulnar deviation: 35 degrees     Additional Active Range of Motion Details  Full composite fist and thumb opposition    Strength/Myotome Testing     Left Wrist/Hand      (2nd hand position)     Trial 1: 40 lbs    Trial 2: 36 lbs    Trial 3: 42 lbs    Average: 39.33 lbs    Right Wrist/Hand      (2nd hand position)     Trial 1: 24 lbs    Trial 2: 29 lbs    Trial 3: 33 lbs    Average: 28.67 lbs    Tests     Right Wrist/Hand   Negative Tinel's sign (medial nerve).     Swelling     Left Wrist/Hand   Circumference wrist: 15 cm    Right Wrist/Hand   Circumference wrist: 16.5 cm      See Exercise, Manual, and Modality Logs for complete treatment.   Pt did have pain with wrist flexion using flexbar on the volar side of her wrist.    Assessment & Plan        Assessment  Impairments: abnormal coordination, abnormal or restricted ROM, activity intolerance, impaired physical strength and pain with function   Other impairment: difficulty styling hair, washing back, writing, brushing teeth  Functional limitations: carrying objects, lifting, pulling and pushing   Prognosis: good    Goals  Plan Goals: 1. The patient complains of pain in the R wrist.                  LTG 1: 12 weeks:  The patient will report a pain rating of 2/10 or better in order to improve tolerance to performance of activities of daily living including styling hair.                                  STATUS:  Not met                  STG 1a: 6 weeks:  The patient will report a pain rating of 4/10 or better at worst.                                   STATUS:  Not met  2. The patient has limited ROM of the R wrist.                  LTG 2: 12 weeks:  The patient will demonstrate 60 degrees of wrist flex/ext to allow the patient to style her hair.                                  STATUS:  Met                   STG 2a: 6 weeks:  The patient will demonstrate 50 degrees of wrist flex/ext.                                  STATUS:  Met                             3. The patient has limited strength of the R hand.                  LTG 3: 12 weeks:  The patient will demonstrate 45 lbs R  strength in order to open jars/bottles with R hand.                                  STATUS:  Not met                  STG 3a: 6 weeks:  The patient will demonstrate good tolerance to  strength testing.                                  STATUS: Met  4. Carrying, Moving, and Handling Objects Functional Limitation                                    LTG 4: 12 weeks:  The patient will achieve a score of 11/55 on the Quick DASH.                                  STATUS:  New                                        Plan  Planned modality interventions: TENS, thermotherapy (hydrocollator packs) and thermotherapy (paraffin  bath)  Planned therapy interventions: manual therapy, functional ROM exercises, fine motor coordination training, flexibility, stretching, strengthening, home exercise program, neuromuscular re-education, soft tissue mobilization and therapeutic activities  Frequency: 2x week  Duration in weeks: 8  Treatment plan discussed with: patient        Visit Diagnoses:    ICD-10-CM ICD-9-CM   1. Closed fracture of distal end of right radius with routine healing, unspecified fracture morphology, subsequent encounter  S52.501D V54.12   2. Stiffness of right wrist joint  M25.631 719.53                Timed:  Manual Therapy:    0     mins  58114;  Therapeutic Exercise:    15     mins  82578;     Therapeutic Activity:    10     mins  44245;  Ultrasound:     0     mins  54832;    Electrical Stimulation:    0     mins 41681;  Neuromuscular Davian:    15    mins  44226;      Timed Treatment:   40   mins   Total Treatment:     40   min    ANASTASIA Briceno/L  Occupational Therapist    Electronically signed   License number [id number]

## 2025-02-28 NOTE — LETTER
Progress Note  2/28/2025  Kentrell Alvarez PA    Re: Kavya De La Cruz  ________________________________________________________________    Mrs. Kavya De La Cruz, has attended 8 OT sessions.  Treatment has consisted of: scar massage, range of motion, desensitization, nerve glides, light strengthening.     S: Mrs. Kavya De La Cruz states: she still has numbness and tingling that has not improved. Pain 6-7/10 every day and more stiffness in the mornings. I am back sewing. Difficulty brushing teeth.    O: ROM wrist flex/ext 60 degrees       R 29 lbs, L 39 lbs        A: Pt progressing well with motion and strength. Pt has still has tingling and pain that has not changed.    P: Please advise after your exam.    Thank you for this referral.      Georgette Duarte, OTR/L

## 2025-03-03 ENCOUNTER — OFFICE VISIT (OUTPATIENT)
Dept: ORTHOPEDIC SURGERY | Facility: CLINIC | Age: 70
End: 2025-03-03
Payer: MEDICARE

## 2025-03-03 VITALS
SYSTOLIC BLOOD PRESSURE: 165 MMHG | HEIGHT: 65 IN | HEART RATE: 76 BPM | OXYGEN SATURATION: 95 % | WEIGHT: 147 LBS | BODY MASS INDEX: 24.49 KG/M2 | DIASTOLIC BLOOD PRESSURE: 75 MMHG

## 2025-03-03 DIAGNOSIS — M25.531 RIGHT WRIST PAIN: ICD-10-CM

## 2025-03-03 DIAGNOSIS — Z47.89 AFTERCARE FOLLOWING SURGERY OF THE MUSCULOSKELETAL SYSTEM: Primary | ICD-10-CM

## 2025-03-03 PROCEDURE — 99024 POSTOP FOLLOW-UP VISIT: CPT | Performed by: PHYSICIAN ASSISTANT

## 2025-03-03 PROCEDURE — 1159F MED LIST DOCD IN RCRD: CPT | Performed by: PHYSICIAN ASSISTANT

## 2025-03-03 PROCEDURE — 1160F RVW MEDS BY RX/DR IN RCRD: CPT | Performed by: PHYSICIAN ASSISTANT

## 2025-03-03 NOTE — PROGRESS NOTES
"Chief Complaint  Follow-up of the Right Wrist    Subjective          History of Present Illness      Kavya De La Cruz is a 69 y.o. female  presents to [facility] for     Patient presents for follow-up evaluation of ORIF right distal radius fracture, 1/15/2025.  Patient states he is disappointed in her recovery she states that she has numbness to her thumb she also states she still has stiffness to her hand and wrist and also some irritation/sensitivity to the incision site.  She states the tip of her thumb feels tingly she states she has sensitivity at her incision site she denies redness drainage or dehiscence.  She is attending therapy at [facility].  She states she stopped wearing her brace 2 weeks ago.  She has been back to work since her surgery occurred.      No Known Allergies     Social History     Socioeconomic History    Marital status:    Tobacco Use    Smoking status: Never    Smokeless tobacco: Never   Vaping Use    Vaping status: Never Used   Substance and Sexual Activity    Alcohol use: Never    Drug use: Never    Sexual activity: Defer        REVIEW OF SYSTEMS    Constitutional: Awake alert and oriented x3, no acute distress, denies fevers, chills, weight loss  Respiratory: No respiratory distress  Vascular: Brisk cap refill, Intact distal pulses, No cyanosis, compartments soft with no signs or symptoms of compartment syndrome or DVT.   Cardiovascular: Denies chest pain, shortness of breath  Skin: Denies rashes, acute skin changes  Neurologic: Denies headache, loss of consciousness  MSK: Right wrist pain      Objective   Vital Signs:   /75   Pulse 76   Ht 165.1 cm (65\")   Wt 66.7 kg (147 lb)   SpO2 95%   BMI 24.46 kg/m²     Body mass index is 24.46 kg/m².    Physical Exam       Right wrist: Incision is healing well, no erythema, no drainage or dehiscence, no signs of infection sensation intact to light touch in all fingers and thumb she states there is a " tingly sensation to the tip of her thumb but not to the other fingers.  Patient able to make a full fist wrist range of motion limited secondary to stiffness, nontender to palpation, no pain with range of motion,  strength appropriate.  2+ radial/ulnar pulses.    Procedures    Imaging Results (Most Recent)       Procedure Component Value Units Date/Time    XR Wrist 2 View Right [[id number]] Resulted: 03/03/25 0853     Updated: 03/03/25 0853    Narrative:      View:AP/Lateral view(s)  Site: Right wrist  Indication: Right wrist pain  Study: X-rays ordered, taken in the office, and reviewed today  X-ray: Good healing of distal radius fracture with intact plate and   screws, no signs of hardware failure or loosening, fracture line remains   stable compared to previous studies  Comparative data: Previous studies             Result Review :   The following data was reviewed by: YARED Lam on 03/03/2025:  Data reviewed : Radiologic studies reviewed by me with the patient              Assessment and Plan    Diagnoses and all orders for this visit:    1. Aftercare following surgery of OPEN REDUCTION INTERNAL FIXATION RIGHT DISTAL RADIUS fracture 1/15/25 (Primary)    2. Right wrist pain  -     XR Wrist 2 View Right        Reviewed x-rays with the patient discussed diagnosis and treatment options with her she was advised to continue therapy for strength and range of motion we discussed she can take Advil for pain as needed.  I offered her a nerve conduction test she would like to avoid this for now follow-up in 4 weeks for recheck with x-rays    Call or return if worsening symptoms.    Follow Up   Return in about 4 weeks (around 3/31/2025) for Recheck.  Patient was given instructions and counseling regarding her condition or for health maintenance advice. Please see specific information pulled into the AVS if appropriate.       EMR Dragon/Transcription disclaimer:  Part of this note may be an electronic  transcription/translation of spoken language to printed text using the Dragon Dictation System

## 2025-03-05 ENCOUNTER — TREATMENT (OUTPATIENT)
Dept: PHYSICAL THERAPY | Facility: CLINIC | Age: 70
End: 2025-03-05
Payer: MEDICARE

## 2025-03-05 DIAGNOSIS — S52.501D CLOSED FRACTURE OF DISTAL END OF RIGHT RADIUS WITH ROUTINE HEALING, UNSPECIFIED FRACTURE MORPHOLOGY, SUBSEQUENT ENCOUNTER: Primary | ICD-10-CM

## 2025-03-05 DIAGNOSIS — M25.631 STIFFNESS OF RIGHT WRIST JOINT: ICD-10-CM

## 2025-03-05 NOTE — PROGRESS NOTES
Occupational Therapy Daily Treatment Note  [address] [place] [address]      Patient: Kavya De La Cruz   : 1955  Referring practitioner: Kentrell Connolly*  Date of Initial Visit: Type: THERAPY  Noted: 2025  Today's Date: 3/5/2025  Patient seen for 9 sessions         Subjective   Kavya De La Cruz reports: it is actually worse, but he says I am over working it. It feels like it goes to sleep faster than it used to.    Objective   See Exercise, Manual, and Modality Logs for complete treatment.     Pt tolerated treatment well for range of motion, strengthening and desensitization. Added resistance to digital extension with good tolerance.  Assessment/Plan    Visit Diagnoses:    ICD-10-CM ICD-9-CM   1. Closed fracture of distal end of right radius with routine healing, unspecified fracture morphology, subsequent encounter  S52.501D V54.12   2. Stiffness of right wrist joint  M25.631 719.53       Continue per POC         Timed:  Manual Therapy:    0     mins  43849;  Therapeutic Exercise:    15     mins  48720;     Therapeutic Activity:    10     mins  23037;  Ultrasound:     0     mins  47321;    Electrical Stimulation:    0     mins 88737;  Neuromuscular Davian:    15    mins  98962;    Timed Treatment:   40   mins   Total Treatment:     40   min    Georgette Duarte OTR/L  Occupational Therapist    Electronically signed   License number [id number]

## 2025-03-07 ENCOUNTER — TREATMENT (OUTPATIENT)
Dept: PHYSICAL THERAPY | Facility: CLINIC | Age: 70
End: 2025-03-07
Payer: MEDICARE

## 2025-03-07 DIAGNOSIS — M25.631 STIFFNESS OF RIGHT WRIST JOINT: ICD-10-CM

## 2025-03-07 DIAGNOSIS — S52.501D CLOSED FRACTURE OF DISTAL END OF RIGHT RADIUS WITH ROUTINE HEALING, UNSPECIFIED FRACTURE MORPHOLOGY, SUBSEQUENT ENCOUNTER: Primary | ICD-10-CM

## 2025-03-07 NOTE — PROGRESS NOTES
Occupational Therapy Daily Treatment Note  [address] [place] [address]      Patient: Kavya De La Cruz   : 1955  Referring practitioner: Kentrell Connolly*  Date of Initial Visit: Type: THERAPY  Noted: 2025  Today's Date: 3/7/2025  Patient seen for 10 sessions         Subjective   Kavya De La Cruz reports: it is always like a toothache. When you touch my skin it burns and feels like poison ivy. My hands are still real stiff in the mornings.     Objective          Observations     Right Wrist/Hand   Positive for edema.     Additional Wrist/Hand Observation Details  Pt has healed 7 cm incision over her volar wrist     Tenderness     Additional Tenderness Details  No tenderness to palpation    Neurological Testing     Sensation     Wrist/Hand     Right   Paresthesia: light touch    Additional Neurological Details  Tingling in her thumb, index and middle fingers has not changed since IE    Active Range of Motion     Right Shoulder   Normal active range of motion    Right Elbow   Normal active range of motion    Right Wrist   Wrist flexion: 70 degrees   Wrist extension: 55 degrees   Radial deviation: 10 degrees with pain  Ulnar deviation: 35 degrees     Additional Active Range of Motion Details  Full composite fist and thumb opposition    Strength/Myotome Testing     Left Wrist/Hand      (2nd hand position)     Trial 1: 40 lbs    Trial 2: 36 lbs    Trial 3: 42 lbs    Average: 39.33 lbs    Right Wrist/Hand      (2nd hand position)     Trial 1: 30 lbs    Trial 2: 35 lbs    Trial 3: 37 lbs    Average: 34 lbs    Tests     Right Wrist/Hand   Negative Tinel's sign (medial nerve).     Swelling     Left Wrist/Hand   Circumference wrist: 15 cm    Right Wrist/Hand   Circumference wrist: 16.5 cm      See Exercise, Manual, and Modality Logs for complete treatment.       Assessment/Plan    Visit Diagnoses:    ICD-10-CM ICD-9-CM   1. Closed fracture of distal end of right radius with routine healing, unspecified  fracture morphology, subsequent encounter  S52.501D V54.12   2. Stiffness of right wrist joint  M25.631 719.53       Continue per POC         Timed:  Manual Therapy:    0     mins  95888;  Therapeutic Exercise:    15     mins  17410;     Therapeutic Activity:    10     mins  80171;  Ultrasound:     0     mins  74539;    Electrical Stimulation:    0     mins 05163;  Neuromuscular Davian:    15    mins  55702;      Timed Treatment:   40   mins   Total Treatment:     40   min    ANASTASIA Briceno/L  Occupational Therapist    Electronically signed   License number [id number]

## 2025-03-12 ENCOUNTER — TREATMENT (OUTPATIENT)
Dept: PHYSICAL THERAPY | Facility: CLINIC | Age: 70
End: 2025-03-12
Payer: MEDICARE

## 2025-03-12 DIAGNOSIS — M25.631 STIFFNESS OF RIGHT WRIST JOINT: ICD-10-CM

## 2025-03-12 DIAGNOSIS — S52.501D CLOSED FRACTURE OF DISTAL END OF RIGHT RADIUS WITH ROUTINE HEALING, UNSPECIFIED FRACTURE MORPHOLOGY, SUBSEQUENT ENCOUNTER: Primary | ICD-10-CM

## 2025-03-12 NOTE — PROGRESS NOTES
Occupational Therapy Daily Treatment Note  [address] [place] [address]      Patient: Kavya De La Cruz   : 1955  Referring practitioner: Kentrell Connolly*  Date of Initial Visit: Type: THERAPY  Noted: 2025  Today's Date: 3/12/2025  Patient seen for 11 sessions         Subjective   Kavya De La Cruz reports: some days it is better, some days it is not. The thumb is not as numb as it was.    Objective   See Exercise, Manual, and Modality Logs for complete treatment.   Pt tolerated treatment well for strengthening, range of motion, and desensitization. Pt  Performing HEP at home using 3 lb with less reps.  Assessment/Plan    Visit Diagnoses:    ICD-10-CM ICD-9-CM   1. Closed fracture of distal end of right radius with routine healing, unspecified fracture morphology, subsequent encounter  S52.501D V54.12   2. Stiffness of right wrist joint  M25.631 719.53       Continue per POC         Timed:  Manual Therapy:    0     mins  65529;  Therapeutic Exercise:    15     mins  59091;     Therapeutic Activity:    10     mins  77484;  Ultrasound:     0     mins  38715;    Electrical Stimulation:    0     mins 45642;  Neuromuscular Davian:    15    mins  36898;      Timed Treatment:   40   mins   Total Treatment:     40   min    ANASTASIA Briceno/L  Occupational Therapist    Electronically signed   License number [id number]

## 2025-03-14 ENCOUNTER — TREATMENT (OUTPATIENT)
Dept: PHYSICAL THERAPY | Facility: CLINIC | Age: 70
End: 2025-03-14
Payer: MEDICARE

## 2025-03-14 DIAGNOSIS — M25.631 STIFFNESS OF RIGHT WRIST JOINT: ICD-10-CM

## 2025-03-14 DIAGNOSIS — S52.501D CLOSED FRACTURE OF DISTAL END OF RIGHT RADIUS WITH ROUTINE HEALING, UNSPECIFIED FRACTURE MORPHOLOGY, SUBSEQUENT ENCOUNTER: Primary | ICD-10-CM

## 2025-03-14 NOTE — PROGRESS NOTES
Occupational Therapy Daily Treatment Note  [address] [place] [address]      Patient: Kavya De La Cruz   : 1955  Referring practitioner: Kentrell Connolly*  Date of Initial Visit: Type: THERAPY  Noted: 2025  Today's Date: 3/14/2025  Patient seen for 12 sessions         Subjective Evaluation    Pain  Current pain ratin  Location: R hand  Quality: dull ache         Kavya De La Cruz reports: it is still like a toothache.  Qucik DASH     Objective          Observations     Right Wrist/Hand   Positive for edema.     Additional Wrist/Hand Observation Details  Pt has healed 7 cm incision over her volar wrist     Tenderness     Additional Tenderness Details  No tenderness to palpation    Neurological Testing     Sensation     Wrist/Hand     Right   Paresthesia: light touch    Additional Neurological Details  Tingling in her thumb, index and middle fingers has not changed since IE    Active Range of Motion     Right Shoulder   Normal active range of motion    Right Elbow   Normal active range of motion    Right Wrist   Wrist flexion: 70 degrees   Wrist extension: 65 degrees   Radial deviation: 10 degrees with pain  Ulnar deviation: 35 degrees     Additional Active Range of Motion Details  Full composite fist and thumb opposition    Strength/Myotome Testing     Left Wrist/Hand      (2nd hand position)     Trial 1: 40 lbs    Trial 2: 35 lbs    Trial 3: 39 lbs    Average: 38 lbs    Right Wrist/Hand      (2nd hand position)     Trial 1: 30 lbs    Trial 2: 33 lbs    Trial 3: 44 lbs    Average: 35.67 lbs    Tests     Right Wrist/Hand   Negative Tinel's sign (medial nerve).     Swelling     Left Wrist/Hand   Circumference wrist: 15 cm    Right Wrist/Hand   Circumference wrist: 16.5 cm      See Exercise, Manual, and Modality Logs for complete treatment.       Assessment/Plan    Visit Diagnoses:    ICD-10-CM ICD-9-CM   1. Closed fracture of distal end of right radius with routine healing, unspecified  fracture morphology, subsequent encounter  S52.501D V54.12   2. Stiffness of right wrist joint  M25.631 719.53       Pt completed plan of care and met most of her goals. Pt to perform HEP. Discharge from Occupational Therapy.         Timed:  Manual Therapy:    0     mins  73011;  Therapeutic Exercise:    15     mins  81647;     Therapeutic Activity:    10     mins  95479;  Ultrasound:     0     mins  38192;    Electrical Stimulation:    0     mins 14682;  Neuromuscular Davian:    15    mins  02163;      Timed Treatment:   40   mins   Total Treatment:     40   min    ANASTASIA Briceno/L  Occupational Therapist    Electronically signed   License number [id number]

## 2025-04-17 ENCOUNTER — OFFICE VISIT (OUTPATIENT)
Dept: ORTHOPEDIC SURGERY | Facility: CLINIC | Age: 70
End: 2025-04-17
Payer: MEDICARE

## 2025-04-17 VITALS
BODY MASS INDEX: 24.49 KG/M2 | HEART RATE: 80 BPM | HEIGHT: 65 IN | DIASTOLIC BLOOD PRESSURE: 101 MMHG | SYSTOLIC BLOOD PRESSURE: 191 MMHG | OXYGEN SATURATION: 96 % | WEIGHT: 147 LBS

## 2025-04-17 DIAGNOSIS — Z47.89 AFTERCARE FOLLOWING SURGERY OF THE MUSCULOSKELETAL SYSTEM: Primary | ICD-10-CM

## 2025-04-17 DIAGNOSIS — M25.531 RIGHT WRIST PAIN: ICD-10-CM

## 2025-04-17 PROCEDURE — 1159F MED LIST DOCD IN RCRD: CPT | Performed by: PHYSICIAN ASSISTANT

## 2025-04-17 PROCEDURE — 1160F RVW MEDS BY RX/DR IN RCRD: CPT | Performed by: PHYSICIAN ASSISTANT

## 2025-04-17 PROCEDURE — 99024 POSTOP FOLLOW-UP VISIT: CPT | Performed by: PHYSICIAN ASSISTANT

## 2025-04-17 NOTE — PROGRESS NOTES
"Chief Complaint  Follow-up of the Right Wrist    Subjective          History of Present Illness      Kavya De La Cruz is a 69 y.o. female  presents to [facility] for     Patient presents for follow-up evaluation of ORIF right distal radius fracture 1/15/2025.  She states she finished therapy since her last visit she also stopped wearing the brace she states that the incision has healed well at last visit she had some numbness to the thumb which she states has resolved, no new complaints she is back to her normal use of her hand and wrist with no difficulties with activities of daily living.  No new complaints      No Known Allergies     Social History     Socioeconomic History    Marital status:    Tobacco Use    Smoking status: Never    Smokeless tobacco: Never   Vaping Use    Vaping status: Never Used   Substance and Sexual Activity    Alcohol use: Never    Drug use: Never    Sexual activity: Defer        REVIEW OF SYSTEMS    Constitutional: Awake alert and oriented x3, no acute distress, denies fevers, chills, weight loss  Respiratory: No respiratory distress  Vascular: Brisk cap refill, Intact distal pulses, No cyanosis, compartments soft with no signs or symptoms of compartment syndrome or DVT.   Cardiovascular: Denies chest pain, shortness of breath  Skin: Denies rashes, acute skin changes  Neurologic: Denies headache, loss of consciousness  MSK: Right wrist pain      Objective   Vital Signs:   BP (!) 191/101 Comment: provider aware.  Pulse 80   Ht 165.1 cm (65\")   Wt 66.7 kg (147 lb)   SpO2 96%   BMI 24.46 kg/m²     Body mass index is 24.46 kg/m².    Physical Exam       Right wrist: Well-healed incision, no erythema, no ecchymosis or swelling, no signs of infection, nontender to palpation, no pain with wrist range of motion 5 out of 5  strength 2+ radial ulnar pulses sensation intact to light touch neurovascular intact, full wrist range of motion with flexion extension " ulnar and radial deviation      Procedures    Imaging Results (Most Recent)       Procedure Component Value Units Date/Time    XR Wrist 2 View Right [[id number]] Resulted: 04/17/25 0909     Updated: 04/17/25 0909    Narrative:      View:AP/Lateral view(s)  Site: Right wrist  Indication: Right wrist pain  Study: X-rays ordered, taken in the office, and reviewed today  X-ray: Good healing of distal radius fracture with intact plate and   screws, no signs of hardware failure or loosening  Comparative data: Previous studies                   Assessment and Plan    Diagnoses and all orders for this visit:    1. Aftercare following surgery of OPEN REDUCTION INTERNAL FIXATION RIGHT DISTAL RADIUS fracture 1/15/25 (Primary)    2. Right wrist pain  -     XR Wrist 2 View Right        Reviewed x-rays with the patient discussed diagnosis and treatment options with her she was advised to continue activity as tolerated follow-up as needed per her request    Call or return if worsening symptoms.    Follow Up   Return if symptoms worsen or fail to improve.  Patient was given instructions and counseling regarding her condition or for health maintenance advice. Please see specific information pulled into the AVS if appropriate.       EMR Dragon/Transcription disclaimer:  Part of this note may be an electronic transcription/translation of spoken language to printed text using the Dragon Dictation System